# Patient Record
Sex: MALE | Race: WHITE | NOT HISPANIC OR LATINO | Employment: UNEMPLOYED | URBAN - METROPOLITAN AREA
[De-identification: names, ages, dates, MRNs, and addresses within clinical notes are randomized per-mention and may not be internally consistent; named-entity substitution may affect disease eponyms.]

---

## 2019-09-05 ENCOUNTER — APPOINTMENT (OUTPATIENT)
Dept: CARDIOLOGY | Facility: HOSPITAL | Age: 29
End: 2019-09-05

## 2019-09-05 ENCOUNTER — HOSPITAL ENCOUNTER (INPATIENT)
Facility: HOSPITAL | Age: 29
LOS: 2 days | Discharge: HOME | End: 2019-09-07
Attending: EMERGENCY MEDICINE | Admitting: INTERNAL MEDICINE

## 2019-09-05 ENCOUNTER — APPOINTMENT (OUTPATIENT)
Dept: GENERAL RADIOLOGY | Facility: HOSPITAL | Age: 29
End: 2019-09-05

## 2019-09-05 DIAGNOSIS — Z91.199 MEDICALLY NONCOMPLIANT: ICD-10-CM

## 2019-09-05 DIAGNOSIS — I16.0 HYPERTENSIVE URGENCY: Primary | ICD-10-CM

## 2019-09-05 DIAGNOSIS — I50.9 ACUTE CONGESTIVE HEART FAILURE, UNSPECIFIED HEART FAILURE TYPE (HCC): ICD-10-CM

## 2019-09-05 PROBLEM — I50.31 DIASTOLIC CHF, ACUTE (HCC): Status: ACTIVE | Noted: 2019-09-05

## 2019-09-05 PROBLEM — E66.01 OBESITY, MORBID, BMI 50 OR HIGHER (HCC): Status: ACTIVE | Noted: 2019-09-05

## 2019-09-05 PROBLEM — I16.1 HYPERTENSIVE EMERGENCY: Status: ACTIVE | Noted: 2019-09-05

## 2019-09-05 LAB
ALBUMIN SERPL-MCNC: 3.6 G/DL (ref 3.5–5.2)
ALBUMIN/GLOB SERPL: 1.2 G/DL
ALP SERPL-CCNC: 76 U/L (ref 39–117)
ALT SERPL W P-5'-P-CCNC: 14 U/L (ref 1–41)
ANION GAP SERPL CALCULATED.3IONS-SCNC: 12 MMOL/L (ref 5–15)
AST SERPL-CCNC: 19 U/L (ref 1–40)
BASOPHILS # BLD AUTO: 0.02 10*3/MM3 (ref 0–0.2)
BASOPHILS NFR BLD AUTO: 0.2 % (ref 0–1.5)
BILIRUB SERPL-MCNC: 0.3 MG/DL (ref 0.2–1.2)
BUN BLD-MCNC: 14 MG/DL (ref 6–20)
BUN/CREAT SERPL: 11.5 (ref 7–25)
CALCIUM SPEC-SCNC: 8.3 MG/DL (ref 8.6–10.5)
CHLORIDE SERPL-SCNC: 100 MMOL/L (ref 98–107)
CO2 SERPL-SCNC: 29 MMOL/L (ref 22–29)
CREAT BLD-MCNC: 1.22 MG/DL (ref 0.76–1.27)
DEPRECATED RDW RBC AUTO: 52 FL (ref 37–54)
EOSINOPHIL # BLD AUTO: 0.26 10*3/MM3 (ref 0–0.4)
EOSINOPHIL NFR BLD AUTO: 3 % (ref 0.3–6.2)
ERYTHROCYTE [DISTWIDTH] IN BLOOD BY AUTOMATED COUNT: 16.8 % (ref 12.3–15.4)
GFR SERPL CREATININE-BSD FRML MDRD: 70 ML/MIN/1.73
GLOBULIN UR ELPH-MCNC: 3.1 GM/DL
GLUCOSE BLD-MCNC: 106 MG/DL (ref 65–99)
HCT VFR BLD AUTO: 42.6 % (ref 37.5–51)
HGB BLD-MCNC: 12.4 G/DL (ref 13–17.7)
HOLD SPECIMEN: NORMAL
IMM GRANULOCYTES # BLD AUTO: 0.02 10*3/MM3 (ref 0–0.05)
IMM GRANULOCYTES NFR BLD AUTO: 0.2 % (ref 0–0.5)
LYMPHOCYTES # BLD AUTO: 2.08 10*3/MM3 (ref 0.7–3.1)
LYMPHOCYTES NFR BLD AUTO: 24.2 % (ref 19.6–45.3)
MAGNESIUM SERPL-MCNC: 1.9 MG/DL (ref 1.6–2.6)
MCH RBC QN AUTO: 24.6 PG (ref 26.6–33)
MCHC RBC AUTO-ENTMCNC: 29.1 G/DL (ref 31.5–35.7)
MCV RBC AUTO: 84.4 FL (ref 79–97)
MONOCYTES # BLD AUTO: 0.91 10*3/MM3 (ref 0.1–0.9)
MONOCYTES NFR BLD AUTO: 10.6 % (ref 5–12)
NEUTROPHILS # BLD AUTO: 5.3 10*3/MM3 (ref 1.7–7)
NEUTROPHILS NFR BLD AUTO: 61.8 % (ref 42.7–76)
NRBC BLD AUTO-RTO: 0.1 /100 WBC (ref 0–0.2)
NT-PROBNP SERPL-MCNC: 2890 PG/ML (ref 5–450)
PLATELET # BLD AUTO: 349 10*3/MM3 (ref 140–450)
PMV BLD AUTO: 9.9 FL (ref 6–12)
POTASSIUM BLD-SCNC: 4.3 MMOL/L (ref 3.5–5.2)
PROT SERPL-MCNC: 6.7 G/DL (ref 6–8.5)
RBC # BLD AUTO: 5.05 10*6/MM3 (ref 4.14–5.8)
SODIUM BLD-SCNC: 141 MMOL/L (ref 136–145)
TROPONIN T SERPL-MCNC: <0.01 NG/ML (ref 0–0.03)
WBC NRBC COR # BLD: 8.59 10*3/MM3 (ref 3.4–10.8)
WHOLE BLOOD HOLD SPECIMEN: NORMAL

## 2019-09-05 PROCEDURE — 71046 X-RAY EXAM CHEST 2 VIEWS: CPT

## 2019-09-05 PROCEDURE — 25010000002 FUROSEMIDE PER 20 MG: Performed by: EMERGENCY MEDICINE

## 2019-09-05 PROCEDURE — 80053 COMPREHEN METABOLIC PANEL: CPT | Performed by: EMERGENCY MEDICINE

## 2019-09-05 PROCEDURE — 99284 EMERGENCY DEPT VISIT MOD MDM: CPT

## 2019-09-05 PROCEDURE — 84484 ASSAY OF TROPONIN QUANT: CPT | Performed by: EMERGENCY MEDICINE

## 2019-09-05 PROCEDURE — 83880 ASSAY OF NATRIURETIC PEPTIDE: CPT | Performed by: EMERGENCY MEDICINE

## 2019-09-05 PROCEDURE — 93010 ELECTROCARDIOGRAM REPORT: CPT | Performed by: INTERNAL MEDICINE

## 2019-09-05 PROCEDURE — 85025 COMPLETE CBC W/AUTO DIFF WBC: CPT | Performed by: EMERGENCY MEDICINE

## 2019-09-05 PROCEDURE — 83735 ASSAY OF MAGNESIUM: CPT | Performed by: EMERGENCY MEDICINE

## 2019-09-05 PROCEDURE — 93970 EXTREMITY STUDY: CPT

## 2019-09-05 PROCEDURE — 93005 ELECTROCARDIOGRAM TRACING: CPT | Performed by: EMERGENCY MEDICINE

## 2019-09-05 RX ORDER — SPIRONOLACTONE 25 MG/1
25 TABLET ORAL DAILY
COMMUNITY

## 2019-09-05 RX ORDER — SODIUM CHLORIDE 0.9 % (FLUSH) 0.9 %
10 SYRINGE (ML) INJECTION AS NEEDED
Status: DISCONTINUED | OUTPATIENT
Start: 2019-09-05 | End: 2019-09-07 | Stop reason: HOSPADM

## 2019-09-05 RX ORDER — SPIRONOLACTONE 50 MG/1
50 TABLET, FILM COATED ORAL DAILY
Status: DISCONTINUED | OUTPATIENT
Start: 2019-09-05 | End: 2019-09-05

## 2019-09-05 RX ORDER — CARVEDILOL 25 MG/1
50 TABLET ORAL 2 TIMES DAILY WITH MEALS
Status: DISCONTINUED | OUTPATIENT
Start: 2019-09-05 | End: 2019-09-07 | Stop reason: HOSPADM

## 2019-09-05 RX ORDER — BUMETANIDE 1 MG/1
1 TABLET ORAL DAILY PRN
COMMUNITY

## 2019-09-05 RX ORDER — BUMETANIDE 0.25 MG/ML
0.5 INJECTION INTRAMUSCULAR; INTRAVENOUS ONCE
Status: COMPLETED | OUTPATIENT
Start: 2019-09-05 | End: 2019-09-05

## 2019-09-05 RX ORDER — ASPIRIN 81 MG/1
81 TABLET ORAL DAILY
Status: DISCONTINUED | OUTPATIENT
Start: 2019-09-06 | End: 2019-09-07 | Stop reason: HOSPADM

## 2019-09-05 RX ORDER — ALBUTEROL SULFATE 90 UG/1
2 AEROSOL, METERED RESPIRATORY (INHALATION) EVERY 6 HOURS PRN
COMMUNITY

## 2019-09-05 RX ORDER — ALBUTEROL SULFATE 2.5 MG/3ML
2.5 SOLUTION RESPIRATORY (INHALATION) EVERY 6 HOURS PRN
Status: DISCONTINUED | OUTPATIENT
Start: 2019-09-05 | End: 2019-09-07 | Stop reason: HOSPADM

## 2019-09-05 RX ORDER — HYDRALAZINE HYDROCHLORIDE 20 MG/ML
20 INJECTION INTRAMUSCULAR; INTRAVENOUS EVERY 4 HOURS PRN
Status: DISCONTINUED | OUTPATIENT
Start: 2019-09-05 | End: 2019-09-05

## 2019-09-05 RX ORDER — AMLODIPINE BESYLATE 5 MG/1
5 TABLET ORAL DAILY
COMMUNITY

## 2019-09-05 RX ORDER — ASPIRIN 81 MG/1
81 TABLET ORAL DAILY
COMMUNITY

## 2019-09-05 RX ORDER — HYDRALAZINE HYDROCHLORIDE 20 MG/ML
10 INJECTION INTRAMUSCULAR; INTRAVENOUS EVERY 6 HOURS PRN
Status: DISCONTINUED | OUTPATIENT
Start: 2019-09-05 | End: 2019-09-07

## 2019-09-05 RX ORDER — BUMETANIDE 0.25 MG/ML
1 INJECTION INTRAMUSCULAR; INTRAVENOUS EVERY 8 HOURS
Status: DISCONTINUED | OUTPATIENT
Start: 2019-09-06 | End: 2019-09-07 | Stop reason: HOSPADM

## 2019-09-05 RX ORDER — LOSARTAN POTASSIUM 100 MG/1
100 TABLET ORAL DAILY
Status: DISCONTINUED | OUTPATIENT
Start: 2019-09-06 | End: 2019-09-06

## 2019-09-05 RX ORDER — LABETALOL HYDROCHLORIDE 5 MG/ML
20 INJECTION, SOLUTION INTRAVENOUS ONCE
Status: COMPLETED | OUTPATIENT
Start: 2019-09-05 | End: 2019-09-05

## 2019-09-05 RX ORDER — FUROSEMIDE 10 MG/ML
40 INJECTION INTRAMUSCULAR; INTRAVENOUS
Status: DISCONTINUED | OUTPATIENT
Start: 2019-09-06 | End: 2019-09-05

## 2019-09-05 RX ORDER — BUMETANIDE 0.25 MG/ML
1 INJECTION INTRAMUSCULAR; INTRAVENOUS
Status: DISCONTINUED | OUTPATIENT
Start: 2019-09-06 | End: 2019-09-05

## 2019-09-05 RX ORDER — BUMETANIDE 0.25 MG/ML
1 INJECTION INTRAMUSCULAR; INTRAVENOUS ONCE
Status: COMPLETED | OUTPATIENT
Start: 2019-09-06 | End: 2019-09-06

## 2019-09-05 RX ORDER — CARVEDILOL 25 MG/1
50 TABLET ORAL 2 TIMES DAILY WITH MEALS
COMMUNITY

## 2019-09-05 RX ORDER — SODIUM CHLORIDE 0.9 % (FLUSH) 0.9 %
10 SYRINGE (ML) INJECTION EVERY 12 HOURS SCHEDULED
Status: DISCONTINUED | OUTPATIENT
Start: 2019-09-05 | End: 2019-09-07 | Stop reason: HOSPADM

## 2019-09-05 RX ORDER — FUROSEMIDE 10 MG/ML
40 INJECTION INTRAMUSCULAR; INTRAVENOUS ONCE
Status: COMPLETED | OUTPATIENT
Start: 2019-09-05 | End: 2019-09-05

## 2019-09-05 RX ORDER — SPIRONOLACTONE 25 MG/1
25 TABLET ORAL DAILY
Status: DISCONTINUED | OUTPATIENT
Start: 2019-09-05 | End: 2019-09-07 | Stop reason: HOSPADM

## 2019-09-05 RX ORDER — LOSARTAN POTASSIUM 100 MG/1
100 TABLET ORAL DAILY
COMMUNITY

## 2019-09-05 RX ORDER — AMLODIPINE BESYLATE 10 MG/1
10 TABLET ORAL DAILY
Status: DISCONTINUED | OUTPATIENT
Start: 2019-09-06 | End: 2019-09-07 | Stop reason: HOSPADM

## 2019-09-05 RX ORDER — HYDRALAZINE HYDROCHLORIDE 20 MG/ML
10 INJECTION INTRAMUSCULAR; INTRAVENOUS EVERY 4 HOURS PRN
Status: DISCONTINUED | OUTPATIENT
Start: 2019-09-05 | End: 2019-09-05

## 2019-09-05 RX ORDER — LOSARTAN POTASSIUM 50 MG/1
100 TABLET ORAL DAILY
COMMUNITY
End: 2019-09-05

## 2019-09-05 RX ADMIN — SODIUM CHLORIDE, PRESERVATIVE FREE 10 ML: 5 INJECTION INTRAVENOUS at 16:56

## 2019-09-05 RX ADMIN — LABETALOL 20 MG/4 ML (5 MG/ML) INTRAVENOUS SYRINGE 20 MG: at 17:01

## 2019-09-05 RX ADMIN — NITROGLYCERIN 1.5 INCH: 20 OINTMENT TOPICAL at 23:48

## 2019-09-05 RX ADMIN — SPIRONOLACTONE 25 MG: 25 TABLET, FILM COATED ORAL at 23:48

## 2019-09-05 RX ADMIN — SODIUM CHLORIDE, PRESERVATIVE FREE 10 ML: 5 INJECTION INTRAVENOUS at 23:51

## 2019-09-05 RX ADMIN — BUMETANIDE 0.5 MG: 0.25 INJECTION INTRAMUSCULAR; INTRAVENOUS at 23:50

## 2019-09-05 RX ADMIN — FUROSEMIDE 40 MG: 10 INJECTION, SOLUTION INTRAMUSCULAR; INTRAVENOUS at 19:08

## 2019-09-05 NOTE — PROGRESS NOTES
Clinical Pharmacy Services: Medication History    Niko Murillo is a 29 y.o. male presenting to [facility] for   Chief Complaint   Patient presents with   • Leg Swelling       He  has a past medical history of Asthma and Hypertension.    Allergies as of 09/05/2019 - Reviewed 09/05/2019   Allergen Reaction Noted   • Amoxicillin Hives 09/05/2019       Medication information was obtained from: Patient, pharmacy  Pharmacy and Phone Number: [facility] [phone]    Prior to Admission Medications     Prescriptions Last Dose Informant Patient Reported? Taking?    albuterol sulfate  (90 Base) MCG/ACT inhaler 9/4/2019 Pharmacy Yes Yes    Inhale 2 puffs Every 6 (Six) Hours As Needed for Wheezing.    amLODIPine (NORVASC) 5 MG tablet 9/4/2019 Pharmacy Yes Yes    Take 5 mg by mouth Daily.    aspirin 81 MG EC tablet 9/4/2019 Pharmacy Yes Yes    Take 81 mg by mouth Daily.    bumetanide (BUMEX) 1 MG tablet 9/4/2019 Pharmacy Yes Yes    Take 1 mg by mouth Daily As Needed.    carvedilol (COREG) 25 MG tablet 9/4/2019 Pharmacy Yes Yes    Take 50 mg by mouth 2 (Two) Times a Day With Meals.    losartan (COZAAR) 100 MG tablet 9/4/2019 Pharmacy Yes Yes    Take 100 mg by mouth Daily.    spironolactone (ALDACTONE) 25 MG tablet 9/4/2019 Pharmacy Yes Yes    Take 25 mg by mouth Daily.            Medication notes: None    This medication list is complete to the best of my knowledge as of 9/5/2019    Please call if questions.    Sophia Cooley, Medication History Technician  9/5/2019 7:01 PM

## 2019-09-05 NOTE — ED NOTES
"Pt states he needs to be seen due to bilat lower extrem swelling.  Pt states he takes bumex. States he was \"off\" his meds for awhile but \"recently started taking them again\". Denies any other symptoms.       Obed Barraza RN  09/05/19 5903       Obed Barraza RN  09/05/19 1446    "

## 2019-09-05 NOTE — ED PROVIDER NOTES
EMERGENCY DEPARTMENT ENCOUNTER    CHIEF COMPLAINT  Chief Complaint: swelling of BLE  History given by: patient  History limited by: nothing  Room Number: 21/21  PMD: Provider, No Known      HPI:  Pt is a 29 y.o. male who presents complaining of swelling of bilateral lower extremities that began four days ago and has gradually worsened. The patient reports the swelling is exacerbated with bearing weight and ambulation. The patient reports the swelling is alleviated with elevation of the extremities. The patient also complains of associated mild shortness of breath and abdominal distention. The patient reports he was recently admitted at a hospital in [place], [place] for similar symptoms. The patient reports he is currently prescribed Bumex. The patient reports he was not compliant with his home medications until recently. There are no other complaints at this time.    Duration: four days  Onset: gradual  Timing: constant  Location: BLE  Radiation: none specified  Quality: swelling  Intensity/Severity: moderate  Progression: gradually worsened  Associated Symptoms: shortness of breath and abdominal distention  Aggravating Factors: The patient reports the swelling is exacerbated with bearing weight and ambulation.  Alleviating Factors: The patient reports the swelling is alleviated with elevation of the extremities.  Previous Episodes: The patient reports he was recently admitted at a hospital in [place], [place] for similar symptoms  Treatment before arrival: none specified    PAST MEDICAL HISTORY  Active Ambulatory Problems     Diagnosis Date Noted   • No Active Ambulatory Problems     Resolved Ambulatory Problems     Diagnosis Date Noted   • No Resolved Ambulatory Problems     Past Medical History:   Diagnosis Date   • Asthma    • Hypertension        PAST SURGICAL HISTORY  Past Surgical History:   Procedure Laterality Date   • ANKLE SURGERY         FAMILY HISTORY  History reviewed. No pertinent family  history.    SOCIAL HISTORY  Social History     Socioeconomic History   • Marital status: Single     Spouse name: Not on file   • Number of children: Not on file   • Years of education: Not on file   • Highest education level: Not on file   Tobacco Use   • Smoking status: Current Every Day Smoker     Packs/day: 0.50     Types: Cigarettes   Substance and Sexual Activity   • Alcohol use: No     Frequency: Never   • Drug use: No       ALLERGIES  Amoxicillin    REVIEW OF SYSTEMS  Review of Systems   Constitutional: Negative for activity change, appetite change and fever.   HENT: Negative for congestion and sore throat.    Eyes: Negative.    Respiratory: Positive for shortness of breath. Negative for cough.    Cardiovascular: Positive for leg swelling (of BLE). Negative for chest pain.   Gastrointestinal: Positive for abdominal distention. Negative for abdominal pain, diarrhea and vomiting.   Endocrine: Negative.    Genitourinary: Negative for decreased urine volume and dysuria.   Musculoskeletal: Negative for neck pain.   Skin: Negative for rash and wound.   Allergic/Immunologic: Negative.    Neurological: Negative for weakness, numbness and headaches.   Hematological: Negative.    Psychiatric/Behavioral: Negative.    All other systems reviewed and are negative.      PHYSICAL EXAM  ED Triage Vitals   Temp Heart Rate Resp BP SpO2   09/05/19 1439 09/05/19 1439 09/05/19 1439 09/05/19 1619 09/05/19 1439   97.7 °F (36.5 °C) 101 22 (!) 182/129 98 %      Temp src Heart Rate Source Patient Position BP Location FiO2 (%)   -- -- -- -- --              Physical Exam   Constitutional: He is oriented to person, place, and time. No distress.   Appears older than stated age.   HENT:   Head: Normocephalic and atraumatic.   Eyes: EOM are normal. Pupils are equal, round, and reactive to light.   Neck: Normal range of motion. Neck supple.   Cardiovascular: Normal rate, regular rhythm and normal heart sounds. Exam reveals no gallop and no  friction rub.   No murmur heard.  He is hypertensive.   Pulmonary/Chest: Effort normal. No respiratory distress. He has no decreased breath sounds. He has wheezes (mild, expiratory). He has no rhonchi. He has no rales. He exhibits no tenderness.   Abdominal: Soft. Bowel sounds are normal. He exhibits no distension and no mass. There is no tenderness. There is no rebound and no guarding.   Abdomen is obese.   Musculoskeletal: Normal range of motion. He exhibits edema (3+ edema of BLE).   Neurological: He is alert and oriented to person, place, and time. He has normal sensation and normal strength.   Skin: Skin is warm and dry.   Psychiatric: Mood and affect normal.   Nursing note and vitals reviewed.      LAB RESULTS  Lab Results (last 24 hours)     Procedure Component Value Units Date/Time    CBC & Differential [[id number]] Collected:  09/05/19 1630    Specimen:  Blood Updated:  09/05/19 1644    Narrative:       The following orders were created for panel order CBC & Differential.  Procedure                               Abnormality         Status                     ---------                               -----------         ------                     CBC Auto Differential[[id number]]        Abnormal            Final result                 Please view results for these tests on the individual orders.    BNP [[id number]]  (Abnormal) Collected:  09/05/19 1630    Specimen:  Blood Updated:  09/05/19 1702     proBNP 2,890.0 pg/mL     Narrative:       Among patients with dyspnea, NT-proBNP is highly sensitive for the detection of acute congestive heart failure. In addition NT-proBNP of <300 pg/ml effectively rules out acute congestive heart failure with 99% negative predictive value.    Troponin [[id number]]  (Normal) Collected:  09/05/19 1630    Specimen:  Blood Updated:  09/05/19 1705     Troponin T <0.010 ng/mL      Comment: Specimen hemolyzed.  Results may be affected.       Narrative:       Troponin T Reference  Range:  <= 0.03 ng/mL-   Negative for AMI  >0.03 ng/mL-     Abnormal for myocardial necrosis.  Clinicians would have to utilize clinical acumen, EKG, Troponin and serial changes to determine if it is an Acute Myocardial Infarction or myocardial injury due to an underlying chronic condition.     CBC Auto Differential [[id number]]  (Abnormal) Collected:  09/05/19 1630    Specimen:  Blood Updated:  09/05/19 1644     WBC 8.59 10*3/mm3      RBC 5.05 10*6/mm3      Hemoglobin 12.4 g/dL      Hematocrit 42.6 %      MCV 84.4 fL      MCH 24.6 pg      MCHC 29.1 g/dL      RDW 16.8 %      RDW-SD 52.0 fl      MPV 9.9 fL      Platelets 349 10*3/mm3      Neutrophil % 61.8 %      Lymphocyte % 24.2 %      Monocyte % 10.6 %      Eosinophil % 3.0 %      Basophil % 0.2 %      Immature Grans % 0.2 %      Neutrophils, Absolute 5.30 10*3/mm3      Lymphocytes, Absolute 2.08 10*3/mm3      Monocytes, Absolute 0.91 10*3/mm3      Eosinophils, Absolute 0.26 10*3/mm3      Basophils, Absolute 0.02 10*3/mm3      Immature Grans, Absolute 0.02 10*3/mm3      nRBC 0.1 /100 WBC     Comprehensive Metabolic Panel [[id number]]  (Abnormal) Collected:  09/05/19 1630    Specimen:  Blood Updated:  09/05/19 1751     Glucose 106 mg/dL      BUN 14 mg/dL      Creatinine 1.22 mg/dL      Sodium 141 mmol/L      Potassium 4.3 mmol/L      Chloride 100 mmol/L      CO2 29.0 mmol/L      Calcium 8.3 mg/dL      Total Protein 6.7 g/dL      Albumin 3.60 g/dL      ALT (SGPT) 14 U/L      AST (SGOT) 19 U/L      Alkaline Phosphatase 76 U/L      Total Bilirubin 0.3 mg/dL      eGFR Non African Amer 70 mL/min/1.73      Globulin 3.1 gm/dL      A/G Ratio 1.2 g/dL      BUN/Creatinine Ratio 11.5     Anion Gap 12.0 mmol/L     Narrative:       GFR Normal >60  Chronic Kidney Disease <60  Kidney Failure <15    Magnesium [[id number]]  (Normal) Collected:  09/05/19 1630    Specimen:  Blood Updated:  09/05/19 1751     Magnesium 1.9 mg/dL           I ordered the above labs and reviewed the  results    RADIOLOGY  XR Chest 2 View   Final Result   Moderate cardiomegaly with pulmonary vascular congestion and   superimposed pulmonary opacification in the bilateral mid and lower lung   fields. Additionally, thickening within the right major and minor   fissures is present and may represent small effusions. Constellation of   findings most likely represent pulmonary edema given patient's history.   Atypical pneumonia is felt to be less likely.       This report was finalized on 9/5/2019 4:18 PM by Dr. Goran Huang M.D.               I ordered the above noted radiological studies. Interpreted by radiologist. Reviewed by me in PACS.       PROCEDURES  Procedures    EKG          EKG time: 1618  Rhythm/Rate: SR, 97  P waves and TN: nml; nml  QRS, axis: narrow complex   ST and T waves: diffuse ST/T wave changes, mildly prolonged QT     Interpreted Contemporaneously by me, independently viewed.  No prior EKG for comparison.    PROGRESS AND CONSULTS  1527 Ordered CXR, EKG, and blood work for further evaluation.    1645 Ordered Labetalol to treat the patient's elevated blood pressure. Ordered blood work and venous doppler of BLE for further evaluation.    1753 Placed call to [facility] for admission. Ordered Lasix to treat congestive heart failure.    1811 Received a call from Concepcion with Vascular and discussed pt's case. She stated the venous doppler of BLE was negative for DVT.    1817 Received a call from Dr. Ole Teixeira ([facility]) and discussed pt's case. Dr. Ole Teixeira agreed with plan to admit the patient to tele for further evaluation and management.  7:34 PM  I had reevaluated the patient informed of the results of the test.  I answered all his questions.  Dr. Augustine Teixeira has seen this patient in the ER already.      MEDICAL DECISION MAKING  Results were reviewed/discussed with the patient and they were also made aware of online access. Pt also made aware that some labs, such as cultures, will not be resulted during  ER visit and follow up with PMD is necessary.     MDM  Number of Diagnoses or Management Options  Acute congestive heart failure, unspecified heart failure type (CMS/HCC):   Hypertensive urgency:   Medically noncompliant:      Amount and/or Complexity of Data Reviewed  Clinical lab tests: ordered and reviewed (BNP: 2,890 and WBC: 8.59)  Tests in the radiology section of CPT®: reviewed and ordered (CXR shows moderate cardiomegaly with pulmonary vascular congestion and superimposed pulmonary opacification in the bilateral mid and lower lung fields. Venous doppler of BLE was negative for DVT.)  Tests in the medicine section of CPT®: reviewed and ordered (See procedure notes for EKG interpretation.)  Decide to obtain previous medical records or to obtain history from someone other than the patient: yes (Epic)  Review and summarize past medical records: yes (The patient has no pertinent recent records in Epic.)  Discuss the patient with other providers: yes (Dr. Ole Teixeira ([facility]))  Independent visualization of images, tracings, or specimens: yes    Patient Progress  Patient progress: stable         DIAGNOSIS  Final diagnoses:   Hypertensive urgency   Acute congestive heart failure, unspecified heart failure type (CMS/HCC)   Medically noncompliant       DISPOSITION  ADMISSION TO [facility]    Discussed treatment plan and reason for admission with pt/family and admitting physician.  Pt/family voiced understanding of the plan for admission for further testing/treatment as needed.    Latest Documented Vital Signs:  As of 6:17 PM  BP- 153/95 HR- 83 Temp- 97.7 °F (36.5 °C) O2 sat- 96%    --  Documentation assistance provided by raul Varner for Dr. Liang Arzate MD.  Information recorded by the scrviviane was done at my direction and has been verified and validated by me.       Adwoa Varner  09/05/19 1833       Liang Arzate MD  09/05/19 1934

## 2019-09-06 ENCOUNTER — APPOINTMENT (OUTPATIENT)
Dept: CARDIOLOGY | Facility: HOSPITAL | Age: 29
End: 2019-09-06

## 2019-09-06 LAB
ANION GAP SERPL CALCULATED.3IONS-SCNC: 11.2 MMOL/L (ref 5–15)
ANION GAP SERPL CALCULATED.3IONS-SCNC: 9.4 MMOL/L (ref 5–15)
BH CV ECHO MEAS - ACS: 2 CM
BH CV ECHO MEAS - AO MAX PG (FULL): 5.7 MMHG
BH CV ECHO MEAS - AO MAX PG: 9.2 MMHG
BH CV ECHO MEAS - AO MEAN PG (FULL): 2 MMHG
BH CV ECHO MEAS - AO MEAN PG: 4 MMHG
BH CV ECHO MEAS - AO ROOT AREA (BSA CORRECTED): 1
BH CV ECHO MEAS - AO ROOT AREA: 7.1 CM^2
BH CV ECHO MEAS - AO ROOT DIAM: 3 CM
BH CV ECHO MEAS - AO V2 MAX: 152 CM/SEC
BH CV ECHO MEAS - AO V2 MEAN: 90.4 CM/SEC
BH CV ECHO MEAS - AO V2 VTI: 26.2 CM
BH CV ECHO MEAS - ASC AORTA: 2.7 CM
BH CV ECHO MEAS - AVA(I,A): 2.6 CM^2
BH CV ECHO MEAS - AVA(I,D): 2.6 CM^2
BH CV ECHO MEAS - AVA(V,A): 2.6 CM^2
BH CV ECHO MEAS - AVA(V,D): 2.6 CM^2
BH CV ECHO MEAS - BSA(HAYCOCK): 3.2 M^2
BH CV ECHO MEAS - BSA: 2.9 M^2
BH CV ECHO MEAS - BZI_BMI: 60.6 KILOGRAMS/M^2
BH CV ECHO MEAS - BZI_METRIC_HEIGHT: 177.8 CM
BH CV ECHO MEAS - BZI_METRIC_WEIGHT: 191.4 KG
BH CV ECHO MEAS - EDV(CUBED): 227 ML
BH CV ECHO MEAS - EDV(MOD-SP2): 194 ML
BH CV ECHO MEAS - EDV(MOD-SP4): 234 ML
BH CV ECHO MEAS - EDV(TEICH): 186.9 ML
BH CV ECHO MEAS - EF(CUBED): 59.9 %
BH CV ECHO MEAS - EF(MOD-BP): 52 %
BH CV ECHO MEAS - EF(MOD-SP2): 50.5 %
BH CV ECHO MEAS - EF(MOD-SP4): 50.4 %
BH CV ECHO MEAS - EF(TEICH): 50.5 %
BH CV ECHO MEAS - ESV(CUBED): 91.1 ML
BH CV ECHO MEAS - ESV(MOD-SP2): 96 ML
BH CV ECHO MEAS - ESV(MOD-SP4): 116 ML
BH CV ECHO MEAS - ESV(TEICH): 92.4 ML
BH CV ECHO MEAS - FS: 26.2 %
BH CV ECHO MEAS - IVS/LVPW: 1
BH CV ECHO MEAS - IVSD: 1.6 CM
BH CV ECHO MEAS - LAT PEAK E' VEL: 9 CM/SEC
BH CV ECHO MEAS - LV DIASTOLIC VOL/BSA (35-75): 81.6 ML/M^2
BH CV ECHO MEAS - LV MASS(C)D: 481 GRAMS
BH CV ECHO MEAS - LV MASS(C)DI: 167.8 GRAMS/M^2
BH CV ECHO MEAS - LV MAX PG: 3.5 MMHG
BH CV ECHO MEAS - LV MEAN PG: 2 MMHG
BH CV ECHO MEAS - LV SYSTOLIC VOL/BSA (12-30): 40.5 ML/M^2
BH CV ECHO MEAS - LV V1 MAX: 93.8 CM/SEC
BH CV ECHO MEAS - LV V1 MEAN: 55.8 CM/SEC
BH CV ECHO MEAS - LV V1 VTI: 16.3 CM
BH CV ECHO MEAS - LVIDD: 6.1 CM
BH CV ECHO MEAS - LVIDS: 4.5 CM
BH CV ECHO MEAS - LVLD AP2: 10 CM
BH CV ECHO MEAS - LVLD AP4: 10.4 CM
BH CV ECHO MEAS - LVLS AP2: 8.7 CM
BH CV ECHO MEAS - LVLS AP4: 8.7 CM
BH CV ECHO MEAS - LVOT AREA (M): 4.2 CM^2
BH CV ECHO MEAS - LVOT AREA: 4.2 CM^2
BH CV ECHO MEAS - LVOT DIAM: 2.3 CM
BH CV ECHO MEAS - LVPWD: 1.6 CM
BH CV ECHO MEAS - MED PEAK E' VEL: 6 CM/SEC
BH CV ECHO MEAS - MV A DUR: 0.1 SEC
BH CV ECHO MEAS - MV A MAX VEL: 68.4 CM/SEC
BH CV ECHO MEAS - MV DEC SLOPE: 672 CM/SEC^2
BH CV ECHO MEAS - MV DEC TIME: 176 SEC
BH CV ECHO MEAS - MV E MAX VEL: 96.5 CM/SEC
BH CV ECHO MEAS - MV E/A: 1.4
BH CV ECHO MEAS - MV MAX PG: 6.4 MMHG
BH CV ECHO MEAS - MV MEAN PG: 2 MMHG
BH CV ECHO MEAS - MV P1/2T MAX VEL: 121 CM/SEC
BH CV ECHO MEAS - MV P1/2T: 52.7 MSEC
BH CV ECHO MEAS - MV V2 MAX: 126 CM/SEC
BH CV ECHO MEAS - MV V2 MEAN: 64 CM/SEC
BH CV ECHO MEAS - MV V2 VTI: 25.5 CM
BH CV ECHO MEAS - MVA P1/2T LCG: 1.8 CM^2
BH CV ECHO MEAS - MVA(P1/2T): 4.2 CM^2
BH CV ECHO MEAS - MVA(VTI): 2.7 CM^2
BH CV ECHO MEAS - PA ACC TIME: 0.13 SEC
BH CV ECHO MEAS - PA MAX PG (FULL): 2.4 MMHG
BH CV ECHO MEAS - PA MAX PG: 5.1 MMHG
BH CV ECHO MEAS - PA PR(ACCEL): 19.6 MMHG
BH CV ECHO MEAS - PA V2 MAX: 113 CM/SEC
BH CV ECHO MEAS - PULM A REVS DUR: 0.09 SEC
BH CV ECHO MEAS - PULM A REVS VEL: 22.7 CM/SEC
BH CV ECHO MEAS - PULM DIAS VEL: 48.4 CM/SEC
BH CV ECHO MEAS - PULM S/D: 0.87
BH CV ECHO MEAS - PULM SYS VEL: 42 CM/SEC
BH CV ECHO MEAS - PVA(V,A): 4.5 CM^2
BH CV ECHO MEAS - PVA(V,D): 4.5 CM^2
BH CV ECHO MEAS - QP/QS: 1.5
BH CV ECHO MEAS - RAP SYSTOLE: 8 MMHG
BH CV ECHO MEAS - RV MAX PG: 2.7 MMHG
BH CV ECHO MEAS - RV MEAN PG: 2 MMHG
BH CV ECHO MEAS - RV V1 MAX: 82 CM/SEC
BH CV ECHO MEAS - RV V1 MEAN: 57.8 CM/SEC
BH CV ECHO MEAS - RV V1 VTI: 16.8 CM
BH CV ECHO MEAS - RVOT AREA: 6.2 CM^2
BH CV ECHO MEAS - RVOT DIAM: 2.8 CM
BH CV ECHO MEAS - RVSP: 34.6 MMHG
BH CV ECHO MEAS - SI(AO): 64.6 ML/M^2
BH CV ECHO MEAS - SI(CUBED): 47.4 ML/M^2
BH CV ECHO MEAS - SI(LVOT): 23.6 ML/M^2
BH CV ECHO MEAS - SI(MOD-SP2): 34.2 ML/M^2
BH CV ECHO MEAS - SI(MOD-SP4): 41.2 ML/M^2
BH CV ECHO MEAS - SI(TEICH): 33 ML/M^2
BH CV ECHO MEAS - SV(AO): 185.2 ML
BH CV ECHO MEAS - SV(CUBED): 135.9 ML
BH CV ECHO MEAS - SV(LVOT): 67.7 ML
BH CV ECHO MEAS - SV(MOD-SP2): 98 ML
BH CV ECHO MEAS - SV(MOD-SP4): 118 ML
BH CV ECHO MEAS - SV(RVOT): 103.4 ML
BH CV ECHO MEAS - SV(TEICH): 94.5 ML
BH CV ECHO MEAS - TAPSE (>1.6): 2.1 CM2
BH CV ECHO MEAS - TR MAX VEL: 258 CM/SEC
BH CV ECHO MEASUREMENTS AVERAGE E/E' RATIO: 12.87
BH CV LOWER VASCULAR LEFT COMMON FEMORAL AUGMENT: NORMAL
BH CV LOWER VASCULAR LEFT COMMON FEMORAL COMPETENT: NORMAL
BH CV LOWER VASCULAR LEFT COMMON FEMORAL COMPRESS: NORMAL
BH CV LOWER VASCULAR LEFT COMMON FEMORAL PHASIC: NORMAL
BH CV LOWER VASCULAR LEFT COMMON FEMORAL SPONT: NORMAL
BH CV LOWER VASCULAR LEFT DISTAL FEMORAL COMPRESS: NORMAL
BH CV LOWER VASCULAR LEFT GASTRONEMIUS COMPRESS: NORMAL
BH CV LOWER VASCULAR LEFT GREATER SAPH AK COMPRESS: NORMAL
BH CV LOWER VASCULAR LEFT GREATER SAPH BK COMPRESS: NORMAL
BH CV LOWER VASCULAR LEFT MID FEMORAL AUGMENT: NORMAL
BH CV LOWER VASCULAR LEFT MID FEMORAL COMPETENT: NORMAL
BH CV LOWER VASCULAR LEFT MID FEMORAL COMPRESS: NORMAL
BH CV LOWER VASCULAR LEFT MID FEMORAL PHASIC: NORMAL
BH CV LOWER VASCULAR LEFT MID FEMORAL SPONT: NORMAL
BH CV LOWER VASCULAR LEFT PERONEAL COMPRESS: NORMAL
BH CV LOWER VASCULAR LEFT POPLITEAL AUGMENT: NORMAL
BH CV LOWER VASCULAR LEFT POPLITEAL COMPETENT: NORMAL
BH CV LOWER VASCULAR LEFT POPLITEAL COMPRESS: NORMAL
BH CV LOWER VASCULAR LEFT POPLITEAL PHASIC: NORMAL
BH CV LOWER VASCULAR LEFT POPLITEAL SPONT: NORMAL
BH CV LOWER VASCULAR LEFT POSTERIOR TIBIAL COMPRESS: NORMAL
BH CV LOWER VASCULAR LEFT PROXIMAL FEMORAL COMPRESS: NORMAL
BH CV LOWER VASCULAR LEFT SAPHENOFEMORAL JUNCTION COMPRESS: NORMAL
BH CV LOWER VASCULAR RIGHT COMMON FEMORAL AUGMENT: NORMAL
BH CV LOWER VASCULAR RIGHT COMMON FEMORAL COMPETENT: NORMAL
BH CV LOWER VASCULAR RIGHT COMMON FEMORAL COMPRESS: NORMAL
BH CV LOWER VASCULAR RIGHT COMMON FEMORAL PHASIC: NORMAL
BH CV LOWER VASCULAR RIGHT COMMON FEMORAL SPONT: NORMAL
BH CV LOWER VASCULAR RIGHT DISTAL FEMORAL COMPRESS: NORMAL
BH CV LOWER VASCULAR RIGHT GASTRONEMIUS COMPRESS: NORMAL
BH CV LOWER VASCULAR RIGHT GREATER SAPH AK COMPRESS: NORMAL
BH CV LOWER VASCULAR RIGHT GREATER SAPH BK COMPRESS: NORMAL
BH CV LOWER VASCULAR RIGHT MID FEMORAL AUGMENT: NORMAL
BH CV LOWER VASCULAR RIGHT MID FEMORAL COMPETENT: NORMAL
BH CV LOWER VASCULAR RIGHT MID FEMORAL COMPRESS: NORMAL
BH CV LOWER VASCULAR RIGHT MID FEMORAL PHASIC: NORMAL
BH CV LOWER VASCULAR RIGHT MID FEMORAL SPONT: NORMAL
BH CV LOWER VASCULAR RIGHT PERONEAL COMPRESS: NORMAL
BH CV LOWER VASCULAR RIGHT POPLITEAL AUGMENT: NORMAL
BH CV LOWER VASCULAR RIGHT POPLITEAL COMPETENT: NORMAL
BH CV LOWER VASCULAR RIGHT POPLITEAL COMPRESS: NORMAL
BH CV LOWER VASCULAR RIGHT POPLITEAL PHASIC: NORMAL
BH CV LOWER VASCULAR RIGHT POPLITEAL SPONT: NORMAL
BH CV LOWER VASCULAR RIGHT POSTERIOR TIBIAL COMPRESS: NORMAL
BH CV LOWER VASCULAR RIGHT PROXIMAL FEMORAL COMPRESS: NORMAL
BH CV LOWER VASCULAR RIGHT SAPHENOFEMORAL JUNCTION COMPRESS: NORMAL
BH CV VAS BP RIGHT ARM: NORMAL MMHG
BH CV XLRA - RV BASE: 4 CM
BH CV XLRA - TDI S': 12 CM/SEC
BUN BLD-MCNC: 14 MG/DL (ref 6–20)
BUN BLD-MCNC: 15 MG/DL (ref 6–20)
BUN/CREAT SERPL: 10.9 (ref 7–25)
BUN/CREAT SERPL: 11 (ref 7–25)
CALCIUM SPEC-SCNC: 8.6 MG/DL (ref 8.6–10.5)
CALCIUM SPEC-SCNC: 8.6 MG/DL (ref 8.6–10.5)
CHLORIDE SERPL-SCNC: 97 MMOL/L (ref 98–107)
CHLORIDE SERPL-SCNC: 99 MMOL/L (ref 98–107)
CO2 SERPL-SCNC: 30.8 MMOL/L (ref 22–29)
CO2 SERPL-SCNC: 31.6 MMOL/L (ref 22–29)
CREAT BLD-MCNC: 1.27 MG/DL (ref 0.76–1.27)
CREAT BLD-MCNC: 1.38 MG/DL (ref 0.76–1.27)
GFR SERPL CREATININE-BSD FRML MDRD: 61 ML/MIN/1.73
GFR SERPL CREATININE-BSD FRML MDRD: 67 ML/MIN/1.73
GLUCOSE BLD-MCNC: 109 MG/DL (ref 65–99)
GLUCOSE BLD-MCNC: 112 MG/DL (ref 65–99)
GLUCOSE BLDC GLUCOMTR-MCNC: 89 MG/DL (ref 70–130)
GLUCOSE BLDC GLUCOMTR-MCNC: 90 MG/DL (ref 70–130)
GLUCOSE BLDC GLUCOMTR-MCNC: 97 MG/DL (ref 70–130)
LEFT ATRIUM VOLUME INDEX: 29 ML/M2
MAGNESIUM SERPL-MCNC: 2 MG/DL (ref 1.6–2.6)
MAXIMAL PREDICTED HEART RATE: 190 BPM
POTASSIUM BLD-SCNC: 3.7 MMOL/L (ref 3.5–5.2)
POTASSIUM BLD-SCNC: 3.8 MMOL/L (ref 3.5–5.2)
SODIUM BLD-SCNC: 139 MMOL/L (ref 136–145)
SODIUM BLD-SCNC: 140 MMOL/L (ref 136–145)
STRESS TARGET HR: 162 BPM
TROPONIN T SERPL-MCNC: <0.01 NG/ML (ref 0–0.03)
TSH SERPL DL<=0.05 MIU/L-ACNC: 1.95 UIU/ML (ref 0.27–4.2)

## 2019-09-06 PROCEDURE — 99253 IP/OBS CNSLTJ NEW/EST LOW 45: CPT | Performed by: NURSE PRACTITIONER

## 2019-09-06 PROCEDURE — 80048 BASIC METABOLIC PNL TOTAL CA: CPT | Performed by: INTERNAL MEDICINE

## 2019-09-06 PROCEDURE — 82962 GLUCOSE BLOOD TEST: CPT

## 2019-09-06 PROCEDURE — 80048 BASIC METABOLIC PNL TOTAL CA: CPT | Performed by: NURSE PRACTITIONER

## 2019-09-06 PROCEDURE — 93306 TTE W/DOPPLER COMPLETE: CPT

## 2019-09-06 PROCEDURE — 93306 TTE W/DOPPLER COMPLETE: CPT | Performed by: INTERNAL MEDICINE

## 2019-09-06 PROCEDURE — 84484 ASSAY OF TROPONIN QUANT: CPT | Performed by: NURSE PRACTITIONER

## 2019-09-06 PROCEDURE — 25010000002 HYDRALAZINE PER 20 MG: Performed by: INTERNAL MEDICINE

## 2019-09-06 PROCEDURE — 83735 ASSAY OF MAGNESIUM: CPT | Performed by: INTERNAL MEDICINE

## 2019-09-06 PROCEDURE — 25010000002 ENOXAPARIN PER 10 MG: Performed by: INTERNAL MEDICINE

## 2019-09-06 PROCEDURE — 84443 ASSAY THYROID STIM HORMONE: CPT | Performed by: NURSE PRACTITIONER

## 2019-09-06 RX ORDER — HYDRALAZINE HYDROCHLORIDE 25 MG/1
25 TABLET, FILM COATED ORAL EVERY 8 HOURS SCHEDULED
Status: DISCONTINUED | OUTPATIENT
Start: 2019-09-06 | End: 2019-09-07

## 2019-09-06 RX ORDER — POTASSIUM CHLORIDE 750 MG/1
10 CAPSULE, EXTENDED RELEASE ORAL
Status: DISCONTINUED | OUTPATIENT
Start: 2019-09-06 | End: 2019-09-07 | Stop reason: HOSPADM

## 2019-09-06 RX ADMIN — BUMETANIDE 1 MG: 0.25 INJECTION INTRAMUSCULAR; INTRAVENOUS at 17:00

## 2019-09-06 RX ADMIN — NITROGLYCERIN 1.5 INCH: 20 OINTMENT TOPICAL at 12:33

## 2019-09-06 RX ADMIN — SODIUM CHLORIDE, PRESERVATIVE FREE 10 ML: 5 INJECTION INTRAVENOUS at 21:49

## 2019-09-06 RX ADMIN — NITROGLYCERIN 1.5 INCH: 20 OINTMENT TOPICAL at 17:16

## 2019-09-06 RX ADMIN — BUMETANIDE 1 MG: 0.25 INJECTION INTRAMUSCULAR; INTRAVENOUS at 00:43

## 2019-09-06 RX ADMIN — POTASSIUM CHLORIDE 10 MEQ: 750 CAPSULE, EXTENDED RELEASE ORAL at 08:51

## 2019-09-06 RX ADMIN — SODIUM CHLORIDE, PRESERVATIVE FREE 10 ML: 5 INJECTION INTRAVENOUS at 08:34

## 2019-09-06 RX ADMIN — CARVEDILOL 50 MG: 25 TABLET, FILM COATED ORAL at 08:51

## 2019-09-06 RX ADMIN — HYDRALAZINE HYDROCHLORIDE 10 MG: 20 INJECTION INTRAMUSCULAR; INTRAVENOUS at 06:52

## 2019-09-06 RX ADMIN — BUMETANIDE 1 MG: 0.25 INJECTION INTRAMUSCULAR; INTRAVENOUS at 23:34

## 2019-09-06 RX ADMIN — HYDRALAZINE HYDROCHLORIDE 25 MG: 25 TABLET, FILM COATED ORAL at 17:15

## 2019-09-06 RX ADMIN — AMLODIPINE BESYLATE 10 MG: 10 TABLET ORAL at 08:51

## 2019-09-06 RX ADMIN — POTASSIUM CHLORIDE 10 MEQ: 750 CAPSULE, EXTENDED RELEASE ORAL at 12:33

## 2019-09-06 RX ADMIN — HYDRALAZINE HYDROCHLORIDE 25 MG: 25 TABLET, FILM COATED ORAL at 21:49

## 2019-09-06 RX ADMIN — ENOXAPARIN SODIUM 40 MG: 40 INJECTION SUBCUTANEOUS at 12:33

## 2019-09-06 RX ADMIN — NITROGLYCERIN 1.5 INCH: 20 OINTMENT TOPICAL at 05:33

## 2019-09-06 RX ADMIN — CARVEDILOL 50 MG: 25 TABLET, FILM COATED ORAL at 17:16

## 2019-09-06 RX ADMIN — POTASSIUM CHLORIDE 10 MEQ: 750 CAPSULE, EXTENDED RELEASE ORAL at 18:09

## 2019-09-06 RX ADMIN — ASPIRIN 81 MG: 81 TABLET, COATED ORAL at 08:33

## 2019-09-06 RX ADMIN — HYDRALAZINE HYDROCHLORIDE 10 MG: 20 INJECTION INTRAMUSCULAR; INTRAVENOUS at 00:42

## 2019-09-06 RX ADMIN — BUMETANIDE 1 MG: 0.25 INJECTION INTRAMUSCULAR; INTRAVENOUS at 08:33

## 2019-09-06 NOTE — PROGRESS NOTES
Discharge Planning Assessment  [facility]     Patient Name: Niko Murillo  MRN: [id number]  Today's Date: 9/6/2019    Admit Date: 9/5/2019    Discharge Needs Assessment     Row Name 09/06/19 1729       Living Environment    Lives With  alone    Current Living Arrangements  home/apartment/condo    Primary Care Provided by  self    Quality of Family Relationships  helpful;involved;supportive       Resource/Environmental Concerns    Resource/Environmental Concerns  financial    Transportation Concerns  car, none       Transition Planning    Patient/Family Anticipates Transition to  home    Patient/Family Anticipated Services at Transition  none    Transportation Anticipated  car, drives self       Discharge Needs Assessment    Readmission Within the Last 30 Days  no previous admission in last 30 days    Equipment Currently Used at Home  none    Anticipated Changes Related to Illness  none        Discharge Plan     Row Name 09/06/19 1730       Plan    Plan Comments  Patient currently listed as self pay, but [employer] notes state that patient has been approved for [employer] MA # [id number].  Sera Cartwright RN    Row Name 09/06/19 1713       Plan    Plan  plans to return home- CCP will follow for needs    Patient/Family in Agreement with Plan  yes    Plan Comments  Spoke with patient at bedside.  Introduced self and explained role.  Facesheet verified.  Patient reports that he lives in [place], but may be moving to [place] in the near future.  Flipn is IADLS.  He does not use any DME and does not haave a HH or SNF history.  Patient does report that he has a PCP in [place], but can't remember his last name.  CCP will follow. Sera Cartwright RN        Destination      No service coordination in this encounter.      Durable Medical Equipment      No service coordination in this encounter.      Dialysis/Infusion      No service coordination in this encounter.      Home Medical Care      No service coordination  in this encounter.      Therapy      No service coordination in this encounter.      Community Resources      No service coordination in this encounter.          Demographic Summary     Row Name 09/06/19 1728       General Information    Admission Type  inpatient    Arrived From  emergency department    Referral Source  admission list    Reason for Consult  discharge planning    Preferred Language  English        Functional Status     Row Name 09/06/19 1728       Functional Status    Usual Activity Tolerance  moderate    Current Activity Tolerance  moderate       Functional Status, IADL    Medications  independent    Meal Preparation  independent    Housekeeping  independent    Laundry  independent    Shopping  independent       Mental Status    General Appearance WDL  WDL       Mental Status Summary    Recent Changes in Mental Status/Cognitive Functioning  no changes        Psychosocial    No documentation.       Abuse/Neglect    No documentation.       Legal    No documentation.       Substance Abuse    No documentation.       Patient Forms    No documentation.           Sera Cartwright RN

## 2019-09-06 NOTE — PLAN OF CARE
Problem: Patient Care Overview  Goal: Plan of Care Review  Outcome: Ongoing (interventions implemented as appropriate)    Goal: Individualization and Mutuality  Outcome: Ongoing (interventions implemented as appropriate)    Goal: Discharge Needs Assessment  Outcome: Ongoing (interventions implemented as appropriate)      Problem: Cardiac: Heart Failure (Adult)  Goal: Signs and Symptoms of Listed Potential Problems Will be Absent, Minimized or Managed (Cardiac: Heart Failure)  Outcome: Ongoing (interventions implemented as appropriate)      Problem: Fluid Volume Excess (Adult)  Goal: Identify Related Risk Factors and Signs and Symptoms  Outcome: Outcome(s) achieved Date Met: 09/06/19    Goal: Optimal Fluid Balance  Outcome: Ongoing (interventions implemented as appropriate)

## 2019-09-06 NOTE — CONSULTS
Patient Identification:  Niko Murillo  29 y.o.  male  1990  [id number]          LOS 1    Requesting physician: Dr Grace    Reason for Consultation:  GEOVANNA    History of Present Illness:   29-year-old super morbidly obese male admitted with acute diastolic CHF and hypertensive emergency.  Signs and symptoms of sleep apnea noted for which we are consulted.  No productive cough or current chest pain.  Witnessed apnea by nursing staff.  Diuresing for CHF symptoms.    Past Medical History:  Past Medical History:   Diagnosis Date   • Asthma    • CHF (congestive heart failure) (CMS/HCC)    • Hypertension        Past Surgical History:  Past Surgical History:   Procedure Laterality Date   • ANKLE SURGERY          Home Meds:  Medications Prior to Admission   Medication Sig Dispense Refill Last Dose   • albuterol sulfate  (90 Base) MCG/ACT inhaler Inhale 2 puffs Every 6 (Six) Hours As Needed for Wheezing.   9/4/2019   • amLODIPine (NORVASC) 5 MG tablet Take 5 mg by mouth Daily.   9/4/2019   • aspirin 81 MG EC tablet Take 81 mg by mouth Daily.   9/4/2019   • bumetanide (BUMEX) 1 MG tablet Take 1 mg by mouth Daily As Needed.   9/4/2019   • carvedilol (COREG) 25 MG tablet Take 50 mg by mouth 2 (Two) Times a Day With Meals.   9/4/2019   • losartan (COZAAR) 100 MG tablet Take 100 mg by mouth Daily.   9/4/2019   • spironolactone (ALDACTONE) 25 MG tablet Take 25 mg by mouth Daily.   9/4/2019         Allergies:  Allergies   Allergen Reactions   • Amoxicillin Hives       Social History:   Social History     Socioeconomic History   • Marital status: Single     Spouse name: Not on file   • Number of children: Not on file   • Years of education: Not on file   • Highest education level: Not on file   Tobacco Use   • Smoking status: Current Every Day Smoker     Packs/day: 0.50     Types: Cigarettes   Substance and Sexual Activity   • Alcohol use: No     Frequency: Never   • Drug use: No       Family History:  History  "reviewed. No pertinent family history.    Review of Systems:  Denies fevers or chills  Denies nausea or vomiting  No new vision or hearing changes  No chest pain  Positive shortness of breath  No diarrhea, hematemesis or hematochezia, no dysuria or frequency  No musculoskeletal complaints  No heat or cold intolerance  No skin rashes  No dizziness or confusion.  No seizure activity  No new anxiety or depression  12 system review of systems performed and all else negative    Objective:    PHYSICAL EXAM:    BP (!) 163/108 (BP Location: Right arm, Patient Position: Sitting)   Pulse 96   Temp 98.4 °F (36.9 °C) (Oral)   Resp 16   Ht 177.8 cm (70\")   Wt (!) 192 kg (422 lb 3.2 oz)   SpO2 100%   BMI 60.58 kg/m²   Body mass index is 60.58 kg/m². 100% (!) 192 kg (422 lb 3.2 oz)    GENERAL APPEARANCE:   · Well developed  · Well nourished  · No acute distress   · Morbidly obese  EYES:    · PERRL                                                                           · Conjunctiva normal  · Sclera non-icteric.  HENT:   · Atraumatic, normocephalic  · External ears and nose normal  · Moist mucous membranes and no ulcers  NECK:  · Thyroid not enlarged  · Trachea midline   RESPIRATORY:    · Nonlabored breathing   · Diminished bilateral breath sounds  · No rales. No wheezing  · No dullness  CARDIOVASCULAR:    · RRR  · Normal S1, S2  · No murmur  · Lower extremity edema: Positive bilateral lower extremity edema  GI:   · Bowel sounds normal  · Abdomen soft , non-distended, non-tender  · No abdominal masses.    MUSCULOSKELETAL:  · Normal movement of extremities  · No tenderness, no deformities  · No clubbing or cyanosis   Skin:    · No visible rashes  · No palpable nodules  PSYCHIATRIC:  · Speech and behavior appropriate  · Normal mood and affect  · Oriented to person, place and time  NEUROLOGIC:      Lab Review:      Lab Results   Component Value Date    WBC 8.59 09/05/2019    HGB 12.4 (L) 09/05/2019    HCT 42.6 09/05/2019    " MCV 84.4 09/05/2019     09/05/2019            Lab Results   Component Value Date    CREATININE 1.38 (H) 09/06/2019    BUN 15 09/06/2019     09/06/2019    K 3.7 09/06/2019    CL 99 09/06/2019    CO2 31.6 (H) 09/06/2019        Lab Results   Component Value Date    TROPONINT <0.010 09/06/2019                Imaging reviewed  Chest x-ray shows cardiomegaly with pulmonary vascular congestion       2D echo reviewed shows EF 41% with grade 1 diastolic dysfunction    Assessment:  GEOVANNA  Acute diastolic CHF  Hypertensive emergency  Morbid obesity    Recommendations:  Positive airway pressure with sleep for sleep apnea.  Sleep study after d/c.      Thank you for allowing me to participate in the care of this patient.  I will continue to follow along with you.      Liang Mancia MD  [facility], [facility]  Pulmonary and Critical Care Medicine    9/6/2019  6:32 PM

## 2019-09-06 NOTE — H&P
Patient Name:  Niko Murillo  YOB: 1990  MRN:  [id number]  Admit Date:  9/5/2019  Patient Care Team:  Provider, No Known as PCP - General      Subjective   History Present Illness     Chief Complaint   Patient presents with   • Leg Swelling       Mr. Murillo is a 29 y.o. with a history of hypertension and likely congestive heart failure.  He was admitted to [facility] in [place] sometime in the past year.  It sounds like this was for uncontrolled hypertension as well as likely congestive heart failure but do not have records at this point.  He had stopped taking his medications sometime ago.  Over the past 4 to 5 days he has had increasing lower extremity swelling, increased dyspnea with exertion as well as rest.  Also with PND and orthopnea.  He came to [facility] emergency room.  Blood pressure was quite high 182/129.  He was given IV Lasix and IV labetalol in the emergency room.  He does state that over the past week he did restart his home medications when he was feeling so poor.      History of Present Illness  Review of Systems   Constitutional: Positive for unexpected weight change. Negative for fever.   HENT: Negative.    Eyes: Negative.    Respiratory: Positive for shortness of breath. Negative for cough.    Cardiovascular: Positive for leg swelling. Negative for chest pain.   Gastrointestinal: Negative.    Endocrine: Negative.    Genitourinary: Negative.    Musculoskeletal: Negative.    Skin: Negative.    Allergic/Immunologic: Negative.    Neurological: Negative.    Hematological: Negative.    Psychiatric/Behavioral: Negative.         Personal History     Past Medical History:   Diagnosis Date   • Asthma    • CHF (congestive heart failure) (CMS/[facility])    • Hypertension    limited as medical records missing currently  Past Surgical History:   Procedure Laterality Date   • ANKLE SURGERY     Fhx- obesity  History reviewed. No pertinent family history.  Social History      Tobacco Use   • Smoking status: Current Every Day Smoker     Packs/day: 0.50     Types: Cigarettes   Substance Use Topics   • Alcohol use: No     Frequency: Never   • Drug use: No     Medications Prior to Admission   Medication Sig Dispense Refill Last Dose   • albuterol sulfate  (90 Base) MCG/ACT inhaler Inhale 2 puffs Every 6 (Six) Hours As Needed for Wheezing.   9/4/2019   • amLODIPine (NORVASC) 5 MG tablet Take 5 mg by mouth Daily.   9/4/2019   • aspirin 81 MG EC tablet Take 81 mg by mouth Daily.   9/4/2019   • bumetanide (BUMEX) 1 MG tablet Take 1 mg by mouth Daily As Needed.   9/4/2019   • carvedilol (COREG) 25 MG tablet Take 50 mg by mouth 2 (Two) Times a Day With Meals.   9/4/2019   • losartan (COZAAR) 100 MG tablet Take 100 mg by mouth Daily.   9/4/2019   • spironolactone (ALDACTONE) 25 MG tablet Take 25 mg by mouth Daily.   9/4/2019     Allergies:    Allergies   Allergen Reactions   • Amoxicillin Hives       Objective    Objective     Vital Signs  Temp:  [97.7 °F (36.5 °C)-97.8 °F (36.6 °C)] 97.8 °F (36.6 °C)  Heart Rate:  [] 97  Resp:  [20-22] 22  BP: (153-188)/() 188/127  SpO2:  [94 %-98 %] 94 %  on   ;   Device (Oxygen Therapy): room air  Body mass index is 62.03 kg/m².    Physical Exam   Constitutional: He is oriented to person, place, and time. He appears well-developed and well-nourished. He appears distressed (slight, obese).   HENT:   Head: Normocephalic and atraumatic.   Mouth/Throat: Oropharynx is clear and moist.   Eyes: Conjunctivae and EOM are normal. No scleral icterus.   Neck: Normal range of motion. Neck supple. JVD present.   Cardiovascular: Normal rate, regular rhythm and normal heart sounds.   Distant heart sounds     Pulmonary/Chest: He is in respiratory distress (slight). He has rales.   Abdominal: Soft. Bowel sounds are normal. There is no tenderness.   Musculoskeletal: Normal range of motion. He exhibits edema (3+ LE edema).   Neurological: He is alert and  oriented to person, place, and time. No cranial nerve deficit. He exhibits normal muscle tone.   Skin: Skin is warm and dry. He is not diaphoretic.   Psychiatric: He has a normal mood and affect. His behavior is normal. Thought content normal.   Nursing note and vitals reviewed.      Results Review:  I reviewed the patient's new clinical results.  I reviewed the patient's new imaging results and agree with the interpretation.  I reviewed the patient's other test results and agree with the interpretation  I personally viewed and interpreted the patient's EKG/Telemetry data  Discussed with ED provider.    Lab Results (last 24 hours)     Procedure Component Value Units Date/Time    CBC & Differential [[id number]] Collected:  09/05/19 1630    Specimen:  Blood Updated:  09/05/19 1644    Narrative:       The following orders were created for panel order CBC & Differential.  Procedure                               Abnormality         Status                     ---------                               -----------         ------                     CBC Auto Differential[[id number]]        Abnormal            Final result                 Please view results for these tests on the individual orders.    BNP [[id number]]  (Abnormal) Collected:  09/05/19 1630    Specimen:  Blood Updated:  09/05/19 1702     proBNP 2,890.0 pg/mL     Narrative:       Among patients with dyspnea, NT-proBNP is highly sensitive for the detection of acute congestive heart failure. In addition NT-proBNP of <300 pg/ml effectively rules out acute congestive heart failure with 99% negative predictive value.    Troponin [[id number]]  (Normal) Collected:  09/05/19 1630    Specimen:  Blood Updated:  09/05/19 1705     Troponin T <0.010 ng/mL      Comment: Specimen hemolyzed.  Results may be affected.       Narrative:       Troponin T Reference Range:  <= 0.03 ng/mL-   Negative for AMI  >0.03 ng/mL-     Abnormal for myocardial necrosis.  Clinicians would have to  utilize clinical acumen, EKG, Troponin and serial changes to determine if it is an Acute Myocardial Infarction or myocardial injury due to an underlying chronic condition.     CBC Auto Differential [[id number]]  (Abnormal) Collected:  09/05/19 1630    Specimen:  Blood Updated:  09/05/19 1644     WBC 8.59 10*3/mm3      RBC 5.05 10*6/mm3      Hemoglobin 12.4 g/dL      Hematocrit 42.6 %      MCV 84.4 fL      MCH 24.6 pg      MCHC 29.1 g/dL      RDW 16.8 %      RDW-SD 52.0 fl      MPV 9.9 fL      Platelets 349 10*3/mm3      Neutrophil % 61.8 %      Lymphocyte % 24.2 %      Monocyte % 10.6 %      Eosinophil % 3.0 %      Basophil % 0.2 %      Immature Grans % 0.2 %      Neutrophils, Absolute 5.30 10*3/mm3      Lymphocytes, Absolute 2.08 10*3/mm3      Monocytes, Absolute 0.91 10*3/mm3      Eosinophils, Absolute 0.26 10*3/mm3      Basophils, Absolute 0.02 10*3/mm3      Immature Grans, Absolute 0.02 10*3/mm3      nRBC 0.1 /100 WBC     Comprehensive Metabolic Panel [[id number]]  (Abnormal) Collected:  09/05/19 1630    Specimen:  Blood Updated:  09/05/19 1751     Glucose 106 mg/dL      BUN 14 mg/dL      Creatinine 1.22 mg/dL      Sodium 141 mmol/L      Potassium 4.3 mmol/L      Chloride 100 mmol/L      CO2 29.0 mmol/L      Calcium 8.3 mg/dL      Total Protein 6.7 g/dL      Albumin 3.60 g/dL      ALT (SGPT) 14 U/L      AST (SGOT) 19 U/L      Alkaline Phosphatase 76 U/L      Total Bilirubin 0.3 mg/dL      eGFR Non African Amer 70 mL/min/1.73      Globulin 3.1 gm/dL      A/G Ratio 1.2 g/dL      BUN/Creatinine Ratio 11.5     Anion Gap 12.0 mmol/L     Narrative:       GFR Normal >60  Chronic Kidney Disease <60  Kidney Failure <15    Magnesium [[id number]]  (Normal) Collected:  09/05/19 1630    Specimen:  Blood Updated:  09/05/19 1751     Magnesium 1.9 mg/dL           Imaging Results (last 24 hours)     Procedure Component Value Units Date/Time    XR Chest 2 View [[id number]] Collected:  09/05/19 1614     Updated:  09/05/19 1620     Narrative:       Chest radiograph     HISTORY:Leg swelling     TECHNIQUE: Two PA and lateral radiographs     COMPARISON:None     FINDINGS:  Pulmonary vascular congestion. Subtle pulmonary opacification is present  within the bilateral mid and lower lung fields.. Mild thickening of the  right major and minor fissures is present. No pneumothorax is seen. The  heart is moderately enlarged.       Impression:       Moderate cardiomegaly with pulmonary vascular congestion and  superimposed pulmonary opacification in the bilateral mid and lower lung  fields. Additionally, thickening within the right major and minor  fissures is present and may represent small effusions. Constellation of  findings most likely represent pulmonary edema given patient's history.  Atypical pneumonia is felt to be less likely.     This report was finalized on 9/5/2019 4:18 PM by Dr. Goran Huang M.D.                  ECG 12 Lead   Final Result   HEART RATE= 97  bpm   RR Interval= 616  ms   NH Interval= 143  ms   P Horizontal Axis= 19  deg   P Front Axis= 47  deg   QRSD Interval= 85  ms   QT Interval= 393  ms   QRS Axis= 48  deg   T Wave Axis= 44  deg   - ABNORMAL ECG -   Sinus rhythm   Probable left atrial enlargement   Prolonged QT interval   NO PRIOR TRACING AVAILABLE FOR COMPARISON   Electronically Signed By: Cely Swenson ([facility]) 05-Sep-2019 17:14:28   Date and Time of Study: 2019-09-05 16:18:16           Assessment/Plan     Active Hospital Problems    Diagnosis POA   • **Diastolic CHF, acute (CMS/HCC) [I50.31] Yes   • Hypertensive emergency [I16.1] Yes   • Obesity, morbid, BMI 50 or higher (CMS/HCC) [E66.01] Unknown         · IV diuretics, monitor renal function and volume status closely  · Cardiology consultation as he is moving from [place] to [place].  Will request records from [place] which can be reviewed by cardiology before ordering 2D echo at this point but can see what they think.  · Additional blood pressure agents.  Also  have added nitro paste with the significant hypertension and for afterload reduction.  Additional hypertensive agents available IV.  We will try to gradually bring down blood pressure.  · I discussed the patients findings and my recommendations with patient and nursing staff.    Code Status - Full code.       Ole Teixeira MD  [facility]  09/05/19  11:26 PM

## 2019-09-06 NOTE — PROGRESS NOTES
"DAILY PROGRESS NOTE  [facility]    Patient Identification:  Name: Niko Murillo  Age: 29 y.o.  Sex: male  :  1990  MRN: [id number]         Primary Care Physician: Provider, No Known    Subjective:  Interval History:He is breathing better today.    Objective:    Scheduled Meds:  amLODIPine 10 mg Oral Daily   aspirin 81 mg Oral Daily   bumetanide 1 mg Intravenous Q8H   carvedilol 50 mg Oral BID With Meals   enoxaparin 40 mg Subcutaneous Q24H   hydrALAZINE 25 mg Oral Q8H   nitroglycerin 1.5 inch Topical Q6H   potassium chloride 10 mEq Oral TID With Meals   sodium chloride 10 mL Intravenous Q12H   spironolactone 25 mg Oral Daily     Continuous Infusions:     Vital signs in last 24 hours:  Temp:  [97.5 °F (36.4 °C)-98.4 °F (36.9 °C)] 98.4 °F (36.9 °C)  Heart Rate:  [] 96  Resp:  [16-22] 16  BP: (152-194)/() 163/108    Intake/Output:    Intake/Output Summary (Last 24 hours) at 2019 1804  Last data filed at 2019 1336  Gross per 24 hour   Intake 2550 ml   Output 2700 ml   Net -150 ml       Exam:  BP (!) 163/108 (BP Location: Right arm, Patient Position: Sitting)   Pulse 96   Temp 98.4 °F (36.9 °C) (Oral)   Resp 16   Ht 177.8 cm (70\")   Wt (!) 192 kg (422 lb 3.2 oz)   SpO2 100%   BMI 60.58 kg/m²     General Appearance:    Alert, cooperative, no distress   Head:    Normocephalic, without obvious abnormality, atraumatic   Eyes:       Throat:   Lips, tongue, gums normal   Neck:   Supple, symmetrical, trachea midline, no JVD   Lungs:     crackles bilaterally, respirations unlabored   Chest Wall:    No tenderness or deformity    Heart:    Regular rate and rhythm, S1 and S2 normal, no murmur,no  Rub or gallop   Abdomen:     Soft, non-tender, bowel sounds active, no masses, no organomegaly    Extremities:   Extremities normal, atraumatic, no cyanosis , leg and pedal edema   Pulses:      Skin:   Skin is warm and dry,  no rashes or palpable lesions   Neurologic:   no focal deficits " noted      Lab Results (last 72 hours)     Procedure Component Value Units Date/Time    POC Glucose Once [[id number]]  (Normal) Collected:  09/06/19 1658    Specimen:  Blood Updated:  09/06/19 1703     Glucose 89 mg/dL     POC Glucose Once [[id number]]  (Normal) Collected:  09/06/19 1202    Specimen:  Blood Updated:  09/06/19 1211     Glucose 90 mg/dL     TSH [[id number]]  (Normal) Collected:  09/06/19 0554    Specimen:  Blood Updated:  09/06/19 0840     TSH 1.950 uIU/mL     Troponin [[id number]]  (Normal) Collected:  09/06/19 0554    Specimen:  Blood Updated:  09/06/19 0840     Troponin T <0.010 ng/mL     Narrative:       Troponin T Reference Range:  <= 0.03 ng/mL-   Negative for AMI  >0.03 ng/mL-     Abnormal for myocardial necrosis.  Clinicians would have to utilize clinical acumen, EKG, Troponin and serial changes to determine if it is an Acute Myocardial Infarction or myocardial injury due to an underlying chronic condition.     POC Glucose Once [[id number]]  (Normal) Collected:  09/06/19 0753    Specimen:  Blood Updated:  09/06/19 0755     Glucose 97 mg/dL     Basic Metabolic Panel [[id number]]  (Abnormal) Collected:  09/06/19 0554    Specimen:  Blood Updated:  09/06/19 0646     Glucose 112 mg/dL      BUN 15 mg/dL      Creatinine 1.38 mg/dL      Sodium 140 mmol/L      Potassium 3.7 mmol/L      Chloride 99 mmol/L      CO2 31.6 mmol/L      Calcium 8.6 mg/dL      eGFR Non African Amer 61 mL/min/1.73      BUN/Creatinine Ratio 10.9     Anion Gap 9.4 mmol/L     Narrative:       GFR Normal >60  Chronic Kidney Disease <60  Kidney Failure <15    Magnesium [[id number]]  (Normal) Collected:  09/06/19 0554    Specimen:  Blood Updated:  09/06/19 0646     Magnesium 2.0 mg/dL     [place] Draw [[id number]] Collected:  09/05/19 1704    Specimen:  Blood Updated:  09/05/19 1930    Narrative:       The following orders were created for panel order [place] Draw.  Procedure                               Abnormality         Status                      ---------                               -----------         ------                     Light Blue Top[[id number]]                                   Final result               Gold Top - SST[[id number]]                                   Final result                 Please view results for these tests on the individual orders.    Gold Top - SST [[id number]] Collected:  09/05/19 1704    Specimen:  Blood Updated:  09/05/19 1930     Extra Tube Hold for add-ons.     Comment: Auto resulted.       Light Blue Top [[id number]] Collected:  09/05/19 1704    Specimen:  Blood Updated:  09/05/19 1815     Extra Tube hold for add-on     Comment: Auto resulted       Comprehensive Metabolic Panel [[id number]]  (Abnormal) Collected:  09/05/19 1630    Specimen:  Blood Updated:  09/05/19 1751     Glucose 106 mg/dL      BUN 14 mg/dL      Creatinine 1.22 mg/dL      Sodium 141 mmol/L      Potassium 4.3 mmol/L      Chloride 100 mmol/L      CO2 29.0 mmol/L      Calcium 8.3 mg/dL      Total Protein 6.7 g/dL      Albumin 3.60 g/dL      ALT (SGPT) 14 U/L      AST (SGOT) 19 U/L      Alkaline Phosphatase 76 U/L      Total Bilirubin 0.3 mg/dL      eGFR Non African Amer 70 mL/min/1.73      Globulin 3.1 gm/dL      A/G Ratio 1.2 g/dL      BUN/Creatinine Ratio 11.5     Anion Gap 12.0 mmol/L     Narrative:       GFR Normal >60  Chronic Kidney Disease <60  Kidney Failure <15    Magnesium [[id number]]  (Normal) Collected:  09/05/19 1630    Specimen:  Blood Updated:  09/05/19 1751     Magnesium 1.9 mg/dL     Troponin [[id number]]  (Normal) Collected:  09/05/19 1630    Specimen:  Blood Updated:  09/05/19 1705     Troponin T <0.010 ng/mL      Comment: Specimen hemolyzed.  Results may be affected.       Narrative:       Troponin T Reference Range:  <= 0.03 ng/mL-   Negative for AMI  >0.03 ng/mL-     Abnormal for myocardial necrosis.  Clinicians would have to utilize clinical acumen, EKG, Troponin and serial changes to determine if it is an Acute Myocardial  Infarction or myocardial injury due to an underlying chronic condition.     BNP [[id number]]  (Abnormal) Collected:  09/05/19 1630    Specimen:  Blood Updated:  09/05/19 1702     proBNP 2,890.0 pg/mL     Narrative:       Among patients with dyspnea, NT-proBNP is highly sensitive for the detection of acute congestive heart failure. In addition NT-proBNP of <300 pg/ml effectively rules out acute congestive heart failure with 99% negative predictive value.    CBC & Differential [[id number]] Collected:  09/05/19 1630    Specimen:  Blood Updated:  09/05/19 1644    Narrative:       The following orders were created for panel order CBC & Differential.  Procedure                               Abnormality         Status                     ---------                               -----------         ------                     CBC Auto Differential[[id number]]        Abnormal            Final result                 Please view results for these tests on the individual orders.    CBC Auto Differential [[id number]]  (Abnormal) Collected:  09/05/19 1630    Specimen:  Blood Updated:  09/05/19 1644     WBC 8.59 10*3/mm3      RBC 5.05 10*6/mm3      Hemoglobin 12.4 g/dL      Hematocrit 42.6 %      MCV 84.4 fL      MCH 24.6 pg      MCHC 29.1 g/dL      RDW 16.8 %      RDW-SD 52.0 fl      MPV 9.9 fL      Platelets 349 10*3/mm3      Neutrophil % 61.8 %      Lymphocyte % 24.2 %      Monocyte % 10.6 %      Eosinophil % 3.0 %      Basophil % 0.2 %      Immature Grans % 0.2 %      Neutrophils, Absolute 5.30 10*3/mm3      Lymphocytes, Absolute 2.08 10*3/mm3      Monocytes, Absolute 0.91 10*3/mm3      Eosinophils, Absolute 0.26 10*3/mm3      Basophils, Absolute 0.02 10*3/mm3      Immature Grans, Absolute 0.02 10*3/mm3      nRBC 0.1 /100 WBC         Data Review:  Results from last 7 days   Lab Units 09/06/19  0554 09/05/19  1630   SODIUM mmol/L 140 141   POTASSIUM mmol/L 3.7 4.3   CHLORIDE mmol/L 99 100   CO2 mmol/L 31.6* 29.0   BUN mg/dL 15 14    CREATININE mg/dL 1.38* 1.22   GLUCOSE mg/dL 112* 106*   CALCIUM mg/dL 8.6 8.3*     Results from last 7 days   Lab Units 09/05/19  1630   WBC 10*3/mm3 8.59   HEMOGLOBIN g/dL 12.4*   HEMATOCRIT % 42.6   PLATELETS 10*3/mm3 349     Results from last 7 days   Lab Units 09/06/19  0554   TSH uIU/mL 1.950         Lab Results   Lab Value Date/Time    TROPONINT <0.010 09/06/2019 0554    TROPONINT <0.010 09/05/2019 1630         Results from last 7 days   Lab Units 09/05/19  1630   ALK PHOS U/L 76   BILIRUBIN mg/dL 0.3   ALT (SGPT) U/L 14   AST (SGOT) U/L 19     Results from last 7 days   Lab Units 09/06/19  0554   TSH uIU/mL 1.950         Glucose   Date/Time Value Ref Range Status   09/06/2019 1658 89 70 - 130 mg/dL Final   09/06/2019 1202 90 70 - 130 mg/dL Final   09/06/2019 0753 97 70 - 130 mg/dL Final           Past Medical History:   Diagnosis Date   • Asthma    • CHF (congestive heart failure) (CMS/[facility])    • Hypertension        Assessment:  Active Hospital Problems    Diagnosis  POA   • **Diastolic CHF, acute (CMS/[facility]) [I50.31]  Yes   • Hypertensive emergency [I16.1]  Yes   • Obesity, morbid, BMI 50 or higher (CMS/[facility]) [E66.01]  Unknown      Resolved Hospital Problems   No resolved problems to display.       Plan:  Continue diuresis and cardiology consult noted. Sleep apnea evaluation.  Pulmonary consult.    Jose Grace MD  9/6/2019  6:04 PM

## 2019-09-06 NOTE — PLAN OF CARE
Problem: Patient Care Overview  Goal: Plan of Care Review  Outcome: Ongoing (interventions implemented as appropriate)   09/06/19 1578   Coping/Psychosocial   Plan of Care Reviewed With patient;significant other   Plan of Care Review   Progress no change   OTHER   Outcome Summary Pt came in through ER last night with uncontrolled HTN and unmanaged CHF. Pt has swollen legs, feet, and abdomen due to fluid retention and has SOB. Pt is noncompliant with his CHF and GEOVANNA. Started diuresing him with IV bumex and continued his home aldactone. Also started nitro paste and hydralazine prn to get BP down. BP's in 170s-180s systolic and 100's diastolic. C/o no pain. AAOx4. Up adlib. On 2L NC during sleep for GEOVANNA. Cardiologist to see in the AM today. HR sinus tach- 100s-110s. Voids well. Noncompliant with his cardiac diet. Continuing to monitor.

## 2019-09-06 NOTE — CONSULTS
[facility] HEART SPECIALISTS CONSULT        Subjective:     Encounter Date:09/05/2019      Patient ID: Niko Murillo is a 29 y.o. male.    Chief Complaint: SOA, edema      HPI:  Niko Murillo is a 29 y.o. male who presents with dyspnea and edema.  He was hospitalized 5/2019 in [place] for congestive heart failure.  He also has a PMH of HTN, asthma, and tobacco abuse.  He stopped taking his meds about a month ago and developed dyspnea and edema.  He tells me that he started taking his meds again this past week but that his symptoms did not improve.  He further reports weight gain, abdominal distention, and PND/orthopnea in which he is sleeping in a chair.  Cardiology was consult for heart failure eval. Pt denies CP.  Prior to last month, he tells me he could perform activities such as climbing a flight of stairs.  He tells me he has not done well with dieting.        Past Medical History:   Diagnosis Date   • Asthma    • CHF (congestive heart failure) (CMS/[facility])    • Hypertension          Past Surgical History:   Procedure Laterality Date   • ANKLE SURGERY           Social History     Socioeconomic History   • Marital status: Single     Spouse name: Not on file   • Number of children: Not on file   • Years of education: Not on file   • Highest education level: Not on file   Tobacco Use   • Smoking status: Current Every Day Smoker     Packs/day: 0.50     Types: Cigarettes   Substance and Sexual Activity   • Alcohol use: No     Frequency: Never   • Drug use: No         Allergies   Allergen Reactions   • Amoxicillin Hives       Current Medications:   Scheduled Meds:  amLODIPine 10 mg Oral Daily   aspirin 81 mg Oral Daily   bumetanide 1 mg Intravenous Q8H   carvedilol 50 mg Oral BID With Meals   enoxaparin 40 mg Subcutaneous Q24H   losartan 100 mg Oral Daily   nitroglycerin 1.5 inch Topical Q6H   sodium chloride 10 mL Intravenous Q12H   spironolactone 25 mg Oral Daily     Continuous Infusions:     ROS  All other systems  "reviewed and are negative.       Objective:         BP (!) 178/113   Pulse 107   Temp 97.5 °F (36.4 °C) (Oral)   Resp 20   Ht 177.8 cm (70\")   Wt (!) 192 kg (422 lb 3.2 oz)   SpO2 94%   BMI 60.58 kg/m²       General: Morbidly obese, in NAD.  Neuro: Somnolent but oriented x 3.  HEENT: sclera clear, no xanthalasmas.  CV: S1S2 RRR. No murmurs or gallops. Neck is thick and unable to appreciate JVD.  Resp: Breathing is unlabored. Lungs CTA/course bases.  GI: BS+. Abdomen obese and NTTP.  Ext: Pedal pulses are palpable. Extremities are with 2-3+ pitting BLE edema.  MS: moves all extremities, no weakness.  Skin: warm, dry.  Psych: calm and cooperative.            Lab Review:     Results from last 7 days   Lab Units 09/06/19  0554 09/05/19  1630   SODIUM mmol/L 140 141   POTASSIUM mmol/L 3.7 4.3   CHLORIDE mmol/L 99 100   CO2 mmol/L 31.6* 29.0   BUN mg/dL 15 14   CREATININE mg/dL 1.38* 1.22   GLUCOSE mg/dL 112* 106*   CALCIUM mg/dL 8.6 8.3*   AST (SGOT) U/L  --  19   ALT (SGPT) U/L  --  14     Results from last 7 days   Lab Units 09/05/19  1630   TROPONIN T ng/mL <0.010     Results from last 7 days   Lab Units 09/05/19  1630   WBC 10*3/mm3 8.59   HEMOGLOBIN g/dL 12.4*   HEMATOCRIT % 42.6   PLATELETS 10*3/mm3 349         Results from last 7 days   Lab Units 09/06/19  0554 09/05/19  1630   MAGNESIUM mg/dL 2.0 1.9           Invalid input(s): LDLCALC  Results from last 7 days   Lab Units 09/05/19  1630   PROBNP pg/mL 2,890.0*           Recent Radiology:  Imaging Results (most recent)     Procedure Component Value Units Date/Time    XR Chest 2 View [[id number]] Collected:  09/05/19 1614     Updated:  09/05/19 1621    Narrative:       Chest radiograph     HISTORY:Leg swelling     TECHNIQUE: Two PA and lateral radiographs     COMPARISON:None     FINDINGS:  Pulmonary vascular congestion. Subtle pulmonary opacification is present  within the bilateral mid and lower lung fields.. Mild thickening of the  right major and minor " fissures is present. No pneumothorax is seen. The  heart is moderately enlarged.       Impression:       Moderate cardiomegaly with pulmonary vascular congestion and  superimposed pulmonary opacification in the bilateral mid and lower lung  fields. Additionally, thickening within the right major and minor  fissures is present and may represent small effusions. Constellation of  findings most likely represent pulmonary edema given patient's history.  Atypical pneumonia is felt to be less likely.     This report was finalized on 9/5/2019 4:18 PM by Dr. Goran Huang M.D.               ECHOCARDIOGRAM:               Assessment:         Active Hospital Problems    Diagnosis POA   • **Diastolic CHF, acute (CMS/[facility]) [I50.31] Yes   • Hypertensive emergency [I16.1] Yes   • Obesity, morbid, BMI 50 or higher (CMS/[facility]) [E66.01] Unknown     1) Acute on Chronic Systolic Heart Failure  - records requested from [place]  - CXR shows vascular congestion, BNP 2890  - troponin negative x one, no acute ST abnormalities    2) Malignant HTN  - on norvasc 10 mg daily, coreg 50 mg bid (started taking this week), losartan 100 mg daily  - nitropaste qid    3) Asthma    4) Tobacco Abuse    5) KATE  - Cr 1.22 --> 1.38  - in setting HTN, HF         Plan:   Repeat troponin, check FLP, Hgb A1c, TSH.  Check 2D echo.  Continue IV diuretics and monitor renal function, electrolytes, strict I/Os, and daily weights.  Cr 1.22 --> 1.38 and will hold ARB while on IV diuretic.  Will observe fluid/Na restricted diet.  Continue to optimize anti-hypertensive regimen.  Consider switching nitropaste to nitro gtt.  Will d/w attending cardiologist for further recommendations.         ARI Schuler  09/06/19  7:47 AM

## 2019-09-07 VITALS
DIASTOLIC BLOOD PRESSURE: 105 MMHG | SYSTOLIC BLOOD PRESSURE: 156 MMHG | OXYGEN SATURATION: 97 % | BODY MASS INDEX: 45.1 KG/M2 | HEIGHT: 70 IN | WEIGHT: 315 LBS | RESPIRATION RATE: 20 BRPM | TEMPERATURE: 98.1 F | HEART RATE: 95 BPM

## 2019-09-07 LAB
ANION GAP SERPL CALCULATED.3IONS-SCNC: 9.2 MMOL/L (ref 5–15)
ARTERIAL PATENCY WRIST A: POSITIVE
ATMOSPHERIC PRESS: 751.4 MMHG
BASE EXCESS BLDA CALC-SCNC: 4.9 MMOL/L (ref 0–2)
BASOPHILS # BLD AUTO: 0.03 10*3/MM3 (ref 0–0.2)
BASOPHILS NFR BLD AUTO: 0.3 % (ref 0–1.5)
BDY SITE: ABNORMAL
BUN BLD-MCNC: 13 MG/DL (ref 6–20)
BUN/CREAT SERPL: 10 (ref 7–25)
CALCIUM SPEC-SCNC: 8.6 MG/DL (ref 8.6–10.5)
CHLORIDE SERPL-SCNC: 96 MMOL/L (ref 98–107)
CHOLEST SERPL-MCNC: 165 MG/DL (ref 0–200)
CO2 SERPL-SCNC: 32.8 MMOL/L (ref 22–29)
CREAT BLD-MCNC: 1.3 MG/DL (ref 0.76–1.27)
DEPRECATED RDW RBC AUTO: 49.3 FL (ref 37–54)
EOSINOPHIL # BLD AUTO: 0.23 10*3/MM3 (ref 0–0.4)
EOSINOPHIL NFR BLD AUTO: 2.7 % (ref 0.3–6.2)
ERYTHROCYTE [DISTWIDTH] IN BLOOD BY AUTOMATED COUNT: 16.5 % (ref 12.3–15.4)
GFR SERPL CREATININE-BSD FRML MDRD: 65 ML/MIN/1.73
GLUCOSE BLD-MCNC: 98 MG/DL (ref 65–99)
HBA1C MFR BLD: 6 % (ref 4.8–5.6)
HCO3 BLDA-SCNC: 30.3 MMOL/L (ref 22–28)
HCT VFR BLD AUTO: 42 % (ref 37.5–51)
HDLC SERPL-MCNC: 37 MG/DL (ref 40–60)
HGB BLD-MCNC: 12.3 G/DL (ref 13–17.7)
HOROWITZ INDEX BLD+IHG-RTO: 21 %
IMM GRANULOCYTES # BLD AUTO: 0.03 10*3/MM3 (ref 0–0.05)
IMM GRANULOCYTES NFR BLD AUTO: 0.3 % (ref 0–0.5)
LDLC SERPL CALC-MCNC: 117 MG/DL (ref 0–100)
LDLC/HDLC SERPL: 3.15 {RATIO}
LYMPHOCYTES # BLD AUTO: 1.66 10*3/MM3 (ref 0.7–3.1)
LYMPHOCYTES NFR BLD AUTO: 19.3 % (ref 19.6–45.3)
MAGNESIUM SERPL-MCNC: 2 MG/DL (ref 1.6–2.6)
MCH RBC QN AUTO: 24.1 PG (ref 26.6–33)
MCHC RBC AUTO-ENTMCNC: 29.3 G/DL (ref 31.5–35.7)
MCV RBC AUTO: 82.2 FL (ref 79–97)
MODALITY: ABNORMAL
MONOCYTES # BLD AUTO: 0.9 10*3/MM3 (ref 0.1–0.9)
MONOCYTES NFR BLD AUTO: 10.5 % (ref 5–12)
NEUTROPHILS # BLD AUTO: 5.76 10*3/MM3 (ref 1.7–7)
NEUTROPHILS NFR BLD AUTO: 66.9 % (ref 42.7–76)
NRBC BLD AUTO-RTO: 0 /100 WBC (ref 0–0.2)
O2 A-A PPRESDIFF RESPIRATORY: 0.9 MMHG
PCO2 BLDA: 46.3 MM HG (ref 35–45)
PH BLDA: 7.42 PH UNITS (ref 7.35–7.45)
PLATELET # BLD AUTO: 348 10*3/MM3 (ref 140–450)
PMV BLD AUTO: 9.6 FL (ref 6–12)
PO2 BLDA: 88.9 MM HG (ref 80–100)
POTASSIUM BLD-SCNC: 3.9 MMOL/L (ref 3.5–5.2)
RBC # BLD AUTO: 5.11 10*6/MM3 (ref 4.14–5.8)
SAO2 % BLDCOA: 96.9 % (ref 92–99)
SODIUM BLD-SCNC: 138 MMOL/L (ref 136–145)
TOTAL RATE: 18 BREATHS/MINUTE
TRIGL SERPL-MCNC: 57 MG/DL (ref 0–150)
VLDLC SERPL-MCNC: 11.4 MG/DL (ref 5–40)
WBC NRBC COR # BLD: 8.61 10*3/MM3 (ref 3.4–10.8)

## 2019-09-07 PROCEDURE — 85025 COMPLETE CBC W/AUTO DIFF WBC: CPT | Performed by: HOSPITALIST

## 2019-09-07 PROCEDURE — 80061 LIPID PANEL: CPT | Performed by: NURSE PRACTITIONER

## 2019-09-07 PROCEDURE — 99233 SBSQ HOSP IP/OBS HIGH 50: CPT | Performed by: INTERNAL MEDICINE

## 2019-09-07 PROCEDURE — 36600 WITHDRAWAL OF ARTERIAL BLOOD: CPT

## 2019-09-07 PROCEDURE — 83036 HEMOGLOBIN GLYCOSYLATED A1C: CPT | Performed by: NURSE PRACTITIONER

## 2019-09-07 PROCEDURE — 82803 BLOOD GASES ANY COMBINATION: CPT

## 2019-09-07 PROCEDURE — 25010000002 ENOXAPARIN PER 10 MG: Performed by: INTERNAL MEDICINE

## 2019-09-07 PROCEDURE — 83735 ASSAY OF MAGNESIUM: CPT | Performed by: INTERNAL MEDICINE

## 2019-09-07 PROCEDURE — 25010000002 HYDRALAZINE PER 20 MG: Performed by: INTERNAL MEDICINE

## 2019-09-07 PROCEDURE — 80048 BASIC METABOLIC PNL TOTAL CA: CPT | Performed by: INTERNAL MEDICINE

## 2019-09-07 RX ORDER — HYDRALAZINE HYDROCHLORIDE 20 MG/ML
10 INJECTION INTRAMUSCULAR; INTRAVENOUS EVERY 4 HOURS PRN
Status: DISCONTINUED | OUTPATIENT
Start: 2019-09-07 | End: 2019-09-07 | Stop reason: HOSPADM

## 2019-09-07 RX ORDER — HYDRALAZINE HYDROCHLORIDE 50 MG/1
50 TABLET, FILM COATED ORAL EVERY 8 HOURS SCHEDULED
Status: DISCONTINUED | OUTPATIENT
Start: 2019-09-07 | End: 2019-09-07 | Stop reason: HOSPADM

## 2019-09-07 RX ADMIN — POTASSIUM CHLORIDE 10 MEQ: 750 CAPSULE, EXTENDED RELEASE ORAL at 08:25

## 2019-09-07 RX ADMIN — HYDRALAZINE HYDROCHLORIDE 50 MG: 50 TABLET, FILM COATED ORAL at 16:13

## 2019-09-07 RX ADMIN — BUMETANIDE 1 MG: 0.25 INJECTION INTRAMUSCULAR; INTRAVENOUS at 10:21

## 2019-09-07 RX ADMIN — HYDRALAZINE HYDROCHLORIDE 25 MG: 25 TABLET, FILM COATED ORAL at 05:59

## 2019-09-07 RX ADMIN — AMLODIPINE BESYLATE 10 MG: 10 TABLET ORAL at 08:28

## 2019-09-07 RX ADMIN — NITROGLYCERIN 1.5 INCH: 20 OINTMENT TOPICAL at 13:03

## 2019-09-07 RX ADMIN — POTASSIUM CHLORIDE 10 MEQ: 750 CAPSULE, EXTENDED RELEASE ORAL at 13:02

## 2019-09-07 RX ADMIN — SODIUM CHLORIDE, PRESERVATIVE FREE 10 ML: 5 INJECTION INTRAVENOUS at 08:28

## 2019-09-07 RX ADMIN — CARVEDILOL 50 MG: 25 TABLET, FILM COATED ORAL at 08:26

## 2019-09-07 RX ADMIN — NITROGLYCERIN 1.5 INCH: 20 OINTMENT TOPICAL at 05:59

## 2019-09-07 RX ADMIN — HYDRALAZINE HYDROCHLORIDE 10 MG: 20 INJECTION INTRAMUSCULAR; INTRAVENOUS at 10:26

## 2019-09-07 RX ADMIN — ENOXAPARIN SODIUM 40 MG: 40 INJECTION SUBCUTANEOUS at 13:02

## 2019-09-07 RX ADMIN — ASPIRIN 81 MG: 81 TABLET, COATED ORAL at 08:25

## 2019-09-07 RX ADMIN — SPIRONOLACTONE 25 MG: 25 TABLET, FILM COATED ORAL at 08:36

## 2019-09-07 NOTE — PLAN OF CARE
Problem: Patient Care Overview  Goal: Plan of Care Review  Outcome: Ongoing (interventions implemented as appropriate)   09/07/19 9288   Coping/Psychosocial   Plan of Care Reviewed With patient;significant other   Plan of Care Review   Progress no change   OTHER   Outcome Summary VSS mostly. Pt does desat when sleeping due to apnea. 2L placed on pt while sleeping-sometimes still desats. Pt up ad jose r. Pt non compliant with 1800 ml/day fluid restriction and diet. Pt education given. Pt on IV bumex and diuresing well. Pt has been sleeping well on and off and SO is at bedside sleeping as well. No complaints of pain or anything else. Will continue to monitor.     Goal: Individualization and Mutuality  Outcome: Ongoing (interventions implemented as appropriate)      Problem: Cardiac: Heart Failure (Adult)  Goal: Signs and Symptoms of Listed Potential Problems Will be Absent, Minimized or Managed (Cardiac: Heart Failure)  Outcome: Ongoing (interventions implemented as appropriate)      Problem: Fluid Volume Excess (Adult)  Goal: Optimal Fluid Balance  Outcome: Ongoing (interventions implemented as appropriate)

## 2019-09-07 NOTE — PROGRESS NOTES
"      [facility]         Dr Blackmon Sayied   LOS: 2 days   Patient Care Team:  Provider, No Known as PCP - General    Chief Complaint: Suspected GEOVANNA/OHS with morbid obesity acute diastolic CHF    Interval History: Events noted chart reviewed.  Patient gets sleepy and per family and nursing staff his snoring and desaturating in his sleep.  Currently awake    REVIEW OF SYSTEMS:   CARDIOVASCULAR: No chest pain, chest pressure or chest discomfort. No palpitations or edema.   RESPIRATORY: Shortness of breath with exertion.  GASTROINTESTINAL: No anorexia, nausea, vomiting or diarrhea. No abdominal pain or blood.   HEMATOLOGIC: No bleeding or bruising.     Ventilator/Non-Invasive Ventilation Settings (From admission, onward)    None            Vital Signs  Temp:  [98.2 °F (36.8 °C)-98.5 °F (36.9 °C)] 98.3 °F (36.8 °C)  Heart Rate:  [87-97] 87  Resp:  [16-18] 18  BP: (123-190)/() 175/100    Intake/Output Summary (Last 24 hours) at 9/7/2019 1204  Last data filed at 9/7/2019 0940  Gross per 24 hour   Intake 1930 ml   Output 2825 ml   Net -895 ml     Flowsheet Rows      First Filed Value   Admission Height  177.8 cm (70\") Documented at 09/05/2019 1618   Admission Weight  163 kg (360 lb)  (Abnormal)  Documented at 09/05/2019 1618          Physical Exam:   General Appearance:    Alert, cooperative, in no acute distress   Lungs:    Diminished breath sounds rhonchi on the bases bilaterally    Heart:    Regular rhythm and normal rate, normal S1 and S2, no            murmur, no gallop, no rub, no click   Chest Wall:    No abnormalities observed   Abdomen:     Normal bowel sounds, no masses, no organomegaly, soft        non-tender, non-distended, no guarding, no rebound                tenderness   Extremities:   Moves all extremities well, no edema, no cyanosis, no             redness     Results Review:        Results from last 7 days   Lab Units 09/07/19  0735 09/06/19  1903 09/06/19  0554   SODIUM mmol/L 138 " 139 140   POTASSIUM mmol/L 3.9 3.8 3.7   CHLORIDE mmol/L 96* 97* 99   CO2 mmol/L 32.8* 30.8* 31.6*   BUN mg/dL 13 14 15   CREATININE mg/dL 1.30* 1.27 1.38*   GLUCOSE mg/dL 98 109* 112*   CALCIUM mg/dL 8.6 8.6 8.6     Results from last 7 days   Lab Units 09/06/19  0554 09/05/19  1630   TROPONIN T ng/mL <0.010 <0.010     Results from last 7 days   Lab Units 09/07/19  0735 09/05/19  1630   WBC 10*3/mm3 8.61 8.59   HEMOGLOBIN g/dL 12.3* 12.4*   HEMATOCRIT % 42.0 42.6   PLATELETS 10*3/mm3 348 349         Results from last 7 days   Lab Units 09/07/19  0735   CHOLESTEROL mg/dL 165     Results from last 7 days   Lab Units 09/07/19  0735   MAGNESIUM mg/dL 2.0     Results from last 7 days   Lab Units 09/07/19  0735   CHOLESTEROL mg/dL 165   TRIGLYCERIDES mg/dL 57   HDL CHOL mg/dL 37*   LDL CHOL mg/dL 117*           I reviewed the patient's new clinical results.  I personally viewed and interpreted the patient's CXR        Medication Review:     amLODIPine 10 mg Oral Daily   aspirin 81 mg Oral Daily   bumetanide 1 mg Intravenous Q8H   carvedilol 50 mg Oral BID With Meals   enoxaparin 40 mg Subcutaneous Q24H   hydrALAZINE 25 mg Oral Q8H   nitroglycerin 1.5 inch Topical Q6H   potassium chloride 10 mEq Oral TID With Meals   sodium chloride 10 mL Intravenous Q12H   spironolactone 25 mg Oral Daily            ASSESSMENT:   GEOVANNA  Acute diastolic CHF  Hypertensive emergency  Morbid obesity    PLAN:  I will check ABGs on room air to see if he has CO2 retention  Auto BiPAP with sleep tonight  Reeducated patient on GEOVANNA and sleep hygiene.  Patient is willing to use CPAP/BiPAP down the road.  Will need outpatient sleep study at discharge  Diuresis per cardiology  We will continue to follow    Neymar Bolivar MD  09/07/19  12:04 PM

## 2019-09-07 NOTE — SIGNIFICANT NOTE
Called into patient's room by NA.  Pt. Expressed wanting to leave against medical advice.  Pt. Explained in detail the risks of leaving the hospital, including, stroke, heart attack, respiratory failure, and death.  Also explained benefits of staying in hospital.  Pt. States he understands risks and benefits of leaving AMA.  AMA paperwork signed by pt. And this RN.  IV removed, heart monitor removed, nitropaste removed, armband removed.  Pt. Left unit on foot.

## 2019-09-07 NOTE — PROGRESS NOTES
"DAILY PROGRESS NOTE  [facility]    Patient Identification:  Name: Niko Murillo  Age: 29 y.o.  Sex: male  :  1990  MRN: [id number]         Primary Care Physician: Provider, No Known    Subjective: patient is sitting up; still complains of edema;   Interval History: follow up for chf, hypertension, obesity, acute diastolic chf    Objective:    Scheduled Meds:  amLODIPine 10 mg Oral Daily   aspirin 81 mg Oral Daily   bumetanide 1 mg Intravenous Q8H   carvedilol 50 mg Oral BID With Meals   enoxaparin 40 mg Subcutaneous Q24H   hydrALAZINE 50 mg Oral Q8H   nitroglycerin 1.5 inch Topical Q6H   potassium chloride 10 mEq Oral TID With Meals   sodium chloride 10 mL Intravenous Q12H   spironolactone 25 mg Oral Daily     Continuous Infusions:     Vital signs in last 24 hours:  Temp:  [98.1 °F (36.7 °C)-98.5 °F (36.9 °C)] 98.1 °F (36.7 °C)  Heart Rate:  [87-96] 95  Resp:  [16-20] 20  BP: (123-175)/() 156/105    Intake/Output:    Intake/Output Summary (Last 24 hours) at 2019 1700  Last data filed at 2019 1353  Gross per 24 hour   Intake 1710 ml   Output 2825 ml   Net -1115 ml       Exam:  BP (!) 156/105   Pulse 95   Temp 98.1 °F (36.7 °C) (Oral)   Resp 20   Ht 177.8 cm (70\")   Wt (!) 190 kg (418 lb 4.8 oz)   SpO2 97%   BMI 60.02 kg/m²     General Appearance:    Alert, cooperative, no distress, AAOx3                          Head:    Normocephalic, without obvious abnormality, atraumatic                           Eyes:    PERRL, conjunctiva/corneas clear, EOM's intact, both eyes                         Throat:   Lips, tongue, gums normal; oral mucosa pink and moist                           Neck:   Supple, symmetrical, trachea midline, no JVD                         Lungs:    Decreased breath sounds bilaterally, respirations unlabored                 Chest Wall:    No tenderness or deformity                          Heart:    Regular rate and rhythm, S1 and S2 normal, no murmur,no  Rub   "                                    or gallop                  Abdomen:     Soft, non-tender, bowel sounds active, no masses, no                                                        organomegaly                  Extremities:   Extremities normal, atraumatic, no cyanosis or edema                        Pulses:   Pulses palpable in all extremities                            Skin:   Skin is warm and dry,  no rashes or palpable lesions              Data Review:  Lab Results   Component Value Date    WBC 8.61 09/07/2019    HGB 12.3 (L) 09/07/2019    HCT 42.0 09/07/2019     09/07/2019     Lab Results   Component Value Date     09/07/2019    K 3.9 09/07/2019    CL 96 (L) 09/07/2019    CO2 32.8 (H) 09/07/2019    BUN 13 09/07/2019    CREATININE 1.30 (H) 09/07/2019    GLUCOSE 98 09/07/2019     Lab Results   Component Value Date    CALCIUM 8.6 09/07/2019    MG 2.0 09/07/2019     Lab Results   Component Value Date    AST 19 09/05/2019    ALT 14 09/05/2019    ALKPHOS 76 09/05/2019     No results found for: APTT, INR  Patient Active Problem List   Diagnosis Code   • Hypertensive emergency I16.1   • Diastolic CHF, acute (CMS/HCC) I50.31   • Obesity, morbid, BMI 50 or higher (CMS/HCC) E66.01       Assessment:    Diastolic CHF, acute (CMS/[facility])    Hypertensive emergency    Obesity, morbid, BMI 50 or higher (CMS/[facility])  ckd3    Plan:  Blood control is improving  Continue diuresis  D.w patient  Creatinine is rising a little  Will monitor  Medium risk  Reviewed prior notes, lab and test results    Leticia Hahn MD  9/7/2019  5:00 PM

## 2019-09-07 NOTE — PROGRESS NOTES
[facility] HEART SPECIALISTS      Hospital Follow Up    LOS:  LOS: 2 days   Patient Name: Niko Murillo  Age/Sex: 29 y.o. male  : 1990  MRN: [id number]    Day of Service: 19   Length of Stay: 2  Encounter Provider: Cesar Hinton MD  Place of Service: [facility] CARDIOLOGY  Patient Care Team:  Provider, No Known as PCP - General    Subjective:     Interval History: 29-year-old male with morbid obesity hypertension and tobacco use.  He is resting supine denies chest pain and dyspnea improved with diuresis but continues demonstrates lower extremity edema.    Current Medications:   Scheduled Meds:  amLODIPine 10 mg Oral Daily   aspirin 81 mg Oral Daily   bumetanide 1 mg Intravenous Q8H   carvedilol 50 mg Oral BID With Meals   enoxaparin 40 mg Subcutaneous Q24H   hydrALAZINE 25 mg Oral Q8H   nitroglycerin 1.5 inch Topical Q6H   potassium chloride 10 mEq Oral TID With Meals   sodium chloride 10 mL Intravenous Q12H   spironolactone 25 mg Oral Daily     Continuous Infusions:     Allergies:  Allergies   Allergen Reactions   • Amoxicillin Hives       Review of Systems   Constitution: Negative for weakness and malaise/fatigue.   HENT: Negative for hearing loss and nosebleeds.    Eyes: Negative for double vision and visual disturbance.   Cardiovascular: Positive for dyspnea on exertion and leg swelling. Negative for chest pain, claudication, near-syncope, palpitations and syncope.   Respiratory: Positive for shortness of breath. Negative for cough, sleep disturbances due to breathing, snoring, sputum production and wheezing.    Endocrine: Negative for cold intolerance, heat intolerance, polydipsia and polyuria.   Hematologic/Lymphatic: Negative for adenopathy and bleeding problem. Does not bruise/bleed easily.   Skin: Negative for flushing and itching.   Musculoskeletal: Negative for back pain, falls, joint pain, joint swelling, muscle weakness and neck pain.   Gastrointestinal:  Negative for abdominal pain, dysphagia, heartburn, nausea and vomiting.   Genitourinary: Negative for dysuria and frequency.   Neurological: Negative for disturbances in coordination, dizziness, light-headedness, loss of balance and numbness.   Psychiatric/Behavioral: Negative for altered mental status and memory loss. The patient is not nervous/anxious.    Allergic/Immunologic: Negative for hives and persistent infections.       Objective:     Temp:  [98.2 °F (36.8 °C)-98.5 °F (36.9 °C)] 98.3 °F (36.8 °C)  Heart Rate:  [87-97] 87  Resp:  [16-18] 18  BP: (123-190)/() 175/100     Intake/Output Summary (Last 24 hours) at 9/7/2019 1255  Last data filed at 9/7/2019 0940  Gross per 24 hour   Intake 1930 ml   Output 2825 ml   Net -895 ml     Body mass index is 60.02 kg/m².      09/05/19 2015 09/06/19  0355 09/07/19  0531   Weight: (!) 196 kg (432 lb 5.2 oz) (!) 192 kg (422 lb 3.2 oz) (!) 190 kg (418 lb 4.8 oz)           Physical Exam   Constitutional: He is oriented to person, place, and time. He appears well-developed and well-nourished.   Morbid obesity, BMI 60   HENT:   Head: Normocephalic and atraumatic.   Eyes: Conjunctivae and EOM are normal. Pupils are equal, round, and reactive to light.   Neck: Neck supple. JVD present. No thyromegaly present.   Cardiovascular: Normal rate, regular rhythm, S1 normal, S2 normal and intact distal pulses. PMI is not displaced. Exam reveals gallop and S4. Exam reveals no distant heart sounds, no friction rub, no midsystolic click and no opening snap.   No murmur heard.  Pulses:       Carotid pulses are 2+ on the right side, and 2+ on the left side.       Radial pulses are 2+ on the right side, and 2+ on the left side.        Femoral pulses are 2+ on the right side, and 2+ on the left side.       Dorsalis pedis pulses are 2+ on the right side, and 2+ on the left side.        Posterior tibial pulses are 2+ on the right side, and 2+ on the left side.   Pulmonary/Chest: Effort  normal. No respiratory distress. He has decreased breath sounds in the right lower field and the left lower field. He has no wheezes.   Abdominal: Soft. Bowel sounds are normal. He exhibits no distension and no mass. There is no tenderness.   Musculoskeletal: Normal range of motion. He exhibits edema.        Right ankle: He exhibits swelling.        Left ankle: He exhibits swelling.   Neurological: He is alert and oriented to person, place, and time.   Skin: Skin is warm and dry. No erythema.   Psychiatric: He has a normal mood and affect.         Lab Review:   Results from last 7 days   Lab Units 09/07/19  0735 09/06/19  1903  09/05/19  1630   SODIUM mmol/L 138 139   < > 141   POTASSIUM mmol/L 3.9 3.8   < > 4.3   CHLORIDE mmol/L 96* 97*   < > 100   CO2 mmol/L 32.8* 30.8*   < > 29.0   BUN mg/dL 13 14   < > 14   CREATININE mg/dL 1.30* 1.27   < > 1.22   GLUCOSE mg/dL 98 109*   < > 106*   CALCIUM mg/dL 8.6 8.6   < > 8.3*   AST (SGOT) U/L  --   --   --  19   ALT (SGPT) U/L  --   --   --  14    < > = values in this interval not displayed.     Results from last 7 days   Lab Units 09/06/19  0554 09/05/19  1630   TROPONIN T ng/mL <0.010 <0.010     Results from last 7 days   Lab Units 09/07/19  0735 09/05/19  1630   WBC 10*3/mm3 8.61 8.59   HEMOGLOBIN g/dL 12.3* 12.4*   HEMATOCRIT % 42.0 42.6   PLATELETS 10*3/mm3 348 349         Results from last 7 days   Lab Units 09/07/19  0735 09/06/19  0554   MAGNESIUM mg/dL 2.0 2.0     Results from last 7 days   Lab Units 09/07/19  0735   CHOLESTEROL mg/dL 165   TRIGLYCERIDES mg/dL 57   HDL CHOL mg/dL 37*     Results from last 7 days   Lab Units 09/05/19  1630   PROBNP pg/mL 2,890.0*     Results from last 7 days   Lab Units 09/06/19  0554   TSH uIU/mL 1.950       Recent Radiology:  Imaging Results (most recent)     Procedure Component Value Units Date/Time    XR Chest 2 View [[id number]] Collected:  09/05/19 1614     Updated:  09/05/19 1621    Narrative:       Chest radiograph      HISTORY:Leg swelling     TECHNIQUE: Two PA and lateral radiographs     COMPARISON:None     FINDINGS:  Pulmonary vascular congestion. Subtle pulmonary opacification is present  within the bilateral mid and lower lung fields.. Mild thickening of the  right major and minor fissures is present. No pneumothorax is seen. The  heart is moderately enlarged.       Impression:       Moderate cardiomegaly with pulmonary vascular congestion and  superimposed pulmonary opacification in the bilateral mid and lower lung  fields. Additionally, thickening within the right major and minor  fissures is present and may represent small effusions. Constellation of  findings most likely represent pulmonary edema given patient's history.  Atypical pneumonia is felt to be less likely.     This report was finalized on 9/5/2019 4:18 PM by Dr. Goran Huang M.D.             I reviewed the patient's new clinical results.    EKG:      Assessment:        1) Acute on Chronic diastolic heart Failure  -Echocardiogram 9/6/2019 reveals left ventricular ejection fraction 52%, moderate concentric left ventricular hypertrophy with grade 1 diastolic dysfunction, mild tricuspid insufficiency, left ventricular systolic pressure less than 35 mmHg  - CXR shows vascular congestion, BNP 2890  - troponin negative x 2, no acute ST abnormalities     2) Malignant HTN  - on norvasc 10 mg daily, coreg 50 mg bid (started taking this week), losartan 100 mg daily, hydralazine 25 mg 3 times daily  - nitropaste qid     3) Asthma     4) Tobacco Abuse     5) KATE  - Cr 1.22 --> 1.38-->1.27-->1.30  - in setting HTN, HF       Plan:   Patient demonstrates hypervolemia, hypertensive cardiovascular disease morbid obesity and tobacco use.  Recommend education with encouragement to restrict salt intake to his close to 2000 mg a day as possible, initiate a walking exercise program and observe a low-cholesterol low saturated fat weight reduction diet.  Strongly encouraged  discontinuation of tobacco use.  We will advance hydralazine to 50 mg 3 times a day, continue to monitor I's and O's, electrolytes and renal function.        Cesar Hinton MD  09/07/19  12:55 PM

## 2019-09-08 NOTE — DISCHARGE SUMMARY
L//                                                                   PHYSICIAN DISCHARGE SUMMARY                                                                        [facility]    Patient Identification:  Name: Niko Murillo  Age: 29 y.o.  Sex: male  :  1990  MRN: [id number]  Primary Care Physician: Provider, No Known    Admit date: 2019  Discharge date and time:2019 left against medical advice  Discharged Condition: fair    Discharge Diagnoses:  Diastolic CHF, acute (CMS/[facility])    Hypertensive emergency    Obesity, morbid, BMI 50 or higher (CMS/[facility])   ckd2  Patient Active Problem List   Diagnosis Code   • Hypertensive emergency I16.1   • Diastolic CHF, acute (CMS/[facility]) I50.31   • Obesity, morbid, BMI 50 or higher (CMS/[facility]) E66.01   suspected sleep apnea    PMHX:   Past Medical History:   Diagnosis Date   • Asthma    • CHF (congestive heart failure) (CMS/[facility])    • Hypertension      PSHX:   Past Surgical History:   Procedure Laterality Date   • ANKLE SURGERY         Hospital Course: Niko Murillo  Was admitted with hypertensive urgency and acute chf; he was diuresed and started on blood pressure medications; workup was undertaken and he was placed on bipap at night due to suspected sleep apnea; he left against medicall advice on the above date    Consults:     Consults     Date and Time Order Name Status Description    2019 1808 Inpatient Pulmonology Consult Completed     2019 2148 Inpatient Cardiology Consult Completed         Results from last 7 days   Lab Units 19  0735   WBC 10*3/mm3 8.61   HEMOGLOBIN g/dL 12.3*   HEMATOCRIT % 42.0   PLATELETS 10*3/mm3 348     Results from last 7 days   Lab Units 19  0735   SODIUM mmol/L 138   POTASSIUM mmol/L 3.9   CHLORIDE mmol/L 96*   CO2 mmol/L 32.8*   BUN mg/dL 13   CREATININE mg/dL 1.30*   GLUCOSE mg/dL 98   CALCIUM mg/dL 8.6     Significant Diagnostic Studies: Labs in chart were reviewed.  Imaging Results (all)      "Procedure Component Value Units Date/Time    XR Chest 2 View [[id number]] Collected:  09/05/19 1614     Updated:  09/05/19 1621    Narrative:       Chest radiograph     HISTORY:Leg swelling     TECHNIQUE: Two PA and lateral radiographs     COMPARISON:None     FINDINGS:  Pulmonary vascular congestion. Subtle pulmonary opacification is present  within the bilateral mid and lower lung fields.. Mild thickening of the  right major and minor fissures is present. No pneumothorax is seen. The  heart is moderately enlarged.       Impression:       Moderate cardiomegaly with pulmonary vascular congestion and  superimposed pulmonary opacification in the bilateral mid and lower lung  fields. Additionally, thickening within the right major and minor  fissures is present and may represent small effusions. Constellation of  findings most likely represent pulmonary edema given patient's history.  Atypical pneumonia is felt to be less likely.     This report was finalized on 9/5/2019 4:18 PM by Dr. Goran Huang M.D.           BP (!) 156/105   Pulse 95   Temp 98.1 °F (36.7 °C) (Oral)   Resp 20   Ht 177.8 cm (70\")   Wt (!) 190 kg (418 lb 4.8 oz)   SpO2 97%   BMI 60.02 kg/m²     Discharge Exam: see earlier exam please; the patient was not seen again prior to leaving [facility]    Disposition:  Against medical advice    Patient Instructions:      Discharge Medications      ASK your doctor about these medications      Instructions Start Date   albuterol sulfate  (90 Base) MCG/ACT inhaler  Commonly known as:  PROVENTIL HFA;VENTOLIN HFA;PROAIR HFA   2 puffs, Inhalation, Every 6 Hours PRN      amLODIPine 5 MG tablet  Commonly known as:  NORVASC   5 mg, Oral, Daily      aspirin 81 MG EC tablet   81 mg, Oral, Daily      bumetanide 1 MG tablet  Commonly known as:  BUMEX   1 mg, Oral, Daily PRN      carvedilol 25 MG tablet  Commonly known as:  COREG   50 mg, Oral, 2 Times Daily With Meals      losartan 100 MG tablet  Commonly known as:  " COZAAR   100 mg, Oral, Daily      spironolactone 25 MG tablet  Commonly known as:  ALDACTONE   25 mg, Oral, Daily             No future appointments.  Follow-up Information     Provider, No Known .    Contact information:  [place] [id number] [phone]                 Discharge Order (From admission, onward)    None           Signed:  Leticia Hahn MD  9/8/2019  12:28 PM

## 2019-09-09 NOTE — PROGRESS NOTES
Case Management Discharge Note    Final Note: Patient was DC'd home 9/7         Transportation Services  Private: Car    Final Discharge Disposition Code: 01 - home or self-care

## 2025-03-10 ENCOUNTER — TRANSCRIBE ORDERS (OUTPATIENT)
Dept: NUTRITION | Facility: HOSPITAL | Age: 35
End: 2025-03-10

## 2025-03-10 DIAGNOSIS — E11.9 TYPE 2 DIABETES MELLITUS WITHOUT COMPLICATION, WITHOUT LONG-TERM CURRENT USE OF INSULIN: Primary | ICD-10-CM
